# Patient Record
Sex: FEMALE | Race: WHITE | NOT HISPANIC OR LATINO | Employment: OTHER | URBAN - METROPOLITAN AREA
[De-identification: names, ages, dates, MRNs, and addresses within clinical notes are randomized per-mention and may not be internally consistent; named-entity substitution may affect disease eponyms.]

---

## 2021-03-01 ENCOUNTER — AMBULATORY - HEALTHEAST (OUTPATIENT)
Dept: NURSING | Facility: CLINIC | Age: 71
End: 2021-03-01

## 2021-03-22 ENCOUNTER — AMBULATORY - HEALTHEAST (OUTPATIENT)
Dept: NURSING | Facility: CLINIC | Age: 71
End: 2021-03-22

## 2021-06-26 ENCOUNTER — HEALTH MAINTENANCE LETTER (OUTPATIENT)
Age: 71
End: 2021-06-26

## 2021-10-16 ENCOUNTER — HEALTH MAINTENANCE LETTER (OUTPATIENT)
Age: 71
End: 2021-10-16

## 2022-07-23 ENCOUNTER — HEALTH MAINTENANCE LETTER (OUTPATIENT)
Age: 72
End: 2022-07-23

## 2022-09-06 ENCOUNTER — TRANSFERRED RECORDS (OUTPATIENT)
Dept: HEALTH INFORMATION MANAGEMENT | Facility: CLINIC | Age: 72
End: 2022-09-06

## 2022-09-17 ENCOUNTER — TRANSFERRED RECORDS (OUTPATIENT)
Dept: HEALTH INFORMATION MANAGEMENT | Facility: CLINIC | Age: 72
End: 2022-09-17

## 2022-10-01 ENCOUNTER — HEALTH MAINTENANCE LETTER (OUTPATIENT)
Age: 72
End: 2022-10-01

## 2022-11-03 ENCOUNTER — OFFICE VISIT (OUTPATIENT)
Dept: CARDIOLOGY | Facility: CLINIC | Age: 72
End: 2022-11-03
Payer: MEDICARE

## 2022-11-03 VITALS
SYSTOLIC BLOOD PRESSURE: 140 MMHG | RESPIRATION RATE: 14 BRPM | DIASTOLIC BLOOD PRESSURE: 88 MMHG | HEART RATE: 106 BPM | BODY MASS INDEX: 17.81 KG/M2 | OXYGEN SATURATION: 96 % | WEIGHT: 104.3 LBS | HEIGHT: 64 IN

## 2022-11-03 DIAGNOSIS — R93.1 ELEVATED CORONARY ARTERY CALCIUM SCORE: Primary | ICD-10-CM

## 2022-11-03 DIAGNOSIS — R07.2 PRECORDIAL PAIN: ICD-10-CM

## 2022-11-03 DIAGNOSIS — M05.712 RHEUMATOID ARTHRITIS INVOLVING BOTH SHOULDERS WITH POSITIVE RHEUMATOID FACTOR (H): ICD-10-CM

## 2022-11-03 DIAGNOSIS — M05.711 RHEUMATOID ARTHRITIS INVOLVING BOTH SHOULDERS WITH POSITIVE RHEUMATOID FACTOR (H): ICD-10-CM

## 2022-11-03 DIAGNOSIS — R01.1 HEART MURMUR: ICD-10-CM

## 2022-11-03 LAB
ALBUMIN SERPL BCG-MCNC: 4.5 G/DL (ref 3.5–5.2)
ALP SERPL-CCNC: 96 U/L (ref 35–104)
ALT SERPL W P-5'-P-CCNC: 29 U/L (ref 10–35)
ANION GAP SERPL CALCULATED.3IONS-SCNC: 16 MMOL/L (ref 7–15)
AST SERPL W P-5'-P-CCNC: 38 U/L (ref 10–35)
BILIRUB SERPL-MCNC: 0.5 MG/DL
BUN SERPL-MCNC: 20.9 MG/DL (ref 8–23)
CALCIUM SERPL-MCNC: 11.1 MG/DL (ref 8.8–10.2)
CHLORIDE SERPL-SCNC: 98 MMOL/L (ref 98–107)
CREAT SERPL-MCNC: 0.82 MG/DL (ref 0.51–0.95)
DEPRECATED HCO3 PLAS-SCNC: 25 MMOL/L (ref 22–29)
GFR SERPL CREATININE-BSD FRML MDRD: 76 ML/MIN/1.73M2
GLUCOSE SERPL-MCNC: 105 MG/DL (ref 70–99)
POTASSIUM SERPL-SCNC: 3.9 MMOL/L (ref 3.4–5.3)
PROT SERPL-MCNC: 7.3 G/DL (ref 6.4–8.3)
SODIUM SERPL-SCNC: 139 MMOL/L (ref 136–145)

## 2022-11-03 PROCEDURE — 36415 COLL VENOUS BLD VENIPUNCTURE: CPT | Performed by: INTERNAL MEDICINE

## 2022-11-03 PROCEDURE — 80053 COMPREHEN METABOLIC PANEL: CPT | Performed by: INTERNAL MEDICINE

## 2022-11-03 PROCEDURE — 99204 OFFICE O/P NEW MOD 45 MIN: CPT | Performed by: INTERNAL MEDICINE

## 2022-11-03 RX ORDER — INFLIXIMAB 100 MG/10ML
INJECTION, POWDER, LYOPHILIZED, FOR SOLUTION INTRAVENOUS
COMMUNITY
Start: 2021-07-30 | End: 2023-11-02

## 2022-11-03 RX ORDER — BUMETANIDE 0.5 MG/1
1 TABLET ORAL DAILY
COMMUNITY
Start: 2022-10-08

## 2022-11-03 RX ORDER — ROSUVASTATIN CALCIUM 5 MG/1
2.5 TABLET, COATED ORAL DAILY
Qty: 45 TABLET | Refills: 3 | Status: SHIPPED | OUTPATIENT
Start: 2022-11-03 | End: 2023-10-04

## 2022-11-03 RX ORDER — ALENDRONATE SODIUM 70 MG/1
70 TABLET ORAL
COMMUNITY
Start: 2021-11-22 | End: 2023-11-02

## 2022-11-03 RX ORDER — FAMCICLOVIR 125 MG/1
500 TABLET ORAL
COMMUNITY
End: 2023-11-02

## 2022-11-03 RX ORDER — LEVOTHYROXINE SODIUM 25 UG/1
25 TABLET ORAL
COMMUNITY
End: 2022-11-03

## 2022-11-03 RX ORDER — PROGESTERONE 100 MG/1
100 CAPSULE ORAL
COMMUNITY
End: 2022-11-03

## 2022-11-03 RX ORDER — LEVOTHYROXINE SODIUM 175 UG/1
175 TABLET ORAL
COMMUNITY
Start: 2022-07-18

## 2022-11-03 RX ORDER — PREDNISONE 1 MG/1
1 TABLET ORAL
COMMUNITY
Start: 2021-07-30 | End: 2023-11-02

## 2022-11-03 RX ORDER — HYDROXYCHLOROQUINE SULFATE 200 MG/1
200 TABLET, FILM COATED ORAL DAILY
COMMUNITY

## 2022-11-03 RX ORDER — MULTIVITAMIN
1 TABLET ORAL DAILY
COMMUNITY
End: 2023-11-02

## 2022-11-03 RX ORDER — METHOTREXATE SODIUM 2.5 MG/1
TABLET ORAL WEEKLY
COMMUNITY
End: 2023-11-02

## 2022-11-03 NOTE — PROGRESS NOTES
[facility]  [phone]      Assessment/Recommendations   Patient with recent calcium score of 21 which puts her in the 51st percentile for age and sex.  2 years prior calcium score was 10.  She does have risk factors with her father having a cerebrovascular accident and dying at age 40 with a myocardial infarction.  Mother had pacemaker and some other kinds of heart disease exact etiology unknown.  Her calcium was all noted to be in the left main coronary artery and because of this I would take an aggressive approach to her risk factor modification and cardiac evaluation.  I have recommended that we check her complete metabolic panel to ensure that her liver functions are normal as she also takes methotrexate for rheumatoid arthritis.  We will start her on Crestor 2.5 mg a day with an LDL goal of less than 70 which should be achieved even with a small dose of statin statins.  Warned her about potential joint body aching with the staff and she will call if she has any side effects.    Because of the left main location and unknown obstructive component of plaque in the left main, I think a stress echocardiogram would be warranted and we will get that in the near future.    She also has a very soft heart murmur and the echocardiogram will be helpful to evaluate that as well.    We reviewed the pathophysiology of stable plaque versus unstable plaque strong desire to avoid cardiac events going forward.  For this and I also recommended a baby aspirin each day or at least every other day and she will give that some thought and discuss it further with her rheumatologist.    I have enjoyed the opportunity to be involved in her care.       History of Present Illness/Subjective    Ms. Ramonita Potts is a 72 year old female with recent calcium score that was 21 with all the calcium being in the left main coronary artery.  She had a prior calcium score 2 years ago that was 10.  She is  "struggled with rheumatoid arthritis in her upper extremities.  In the past she was and really had no functional limitations joint issues are what limits her now.  She denies any unusual shortness of breath with activity and occasionally gets some chest discomfort in her left lateral chest which is quite noticeable but not severe.  This lasted for 1 to 2 minutes and then spontaneously goes away she feels it is most likely related to his emotional stress.  She does not notice this when she is out walking or being physically active.  She denies orthopnea, paroxysmal nocturnal dyspnea, and has had some fluid retention and takes a small dose of Bumex.  She has done that for years.  She has no history rheumatic fever, heart murmur, cerebrovascular accident or TIA.  She has not been treated for hypertension, recent LDL cholesterol was 88.  She is not diabetic and has never smoked cigarettes.  Her father had a stroke and she reports he  of a myocardial infarction at age 40.  2 brothers have heart problems of a variety maker.    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         Physical Examination Review of Systems   BP (!) 140/88 (BP Location: Left arm, Patient Position: Sitting, Cuff Size: Adult Regular)   Pulse 106   Resp 14   Ht 1.613 m (5' 3.5\")   Wt 47.3 kg (104 lb 4.8 oz)   SpO2 96%   BMI 18.19 kg/m    Body mass index is 18.19 kg/m .  Wt Readings from Last 3 Encounters:   22 47.3 kg (104 lb 4.8 oz)     General Appearance:   Alert, cooperative and in no acute distress.   ENT/Mouth: Patient wearing a mask.      EYES:  no scleral icterus, normal conjunctivae   Neck: JVP normal. No Hepatojugular reflux. Thyroid not visualized.   Chest/Lungs:   Lungs are clear to auscultation, equal chest wall expansion.   Cardiovascular:   S1, S2 with 1/6 systolic murmur heard at the right upper sternal border, no clicks or rubs. Brachial, radial " "and posterior tibial pulses are intact and symetric. No carotid bruits noted   Abdomen:  Nontender. BS+.    Extremities: No cyanosis, clubbing or edema   Skin: no xanthelasma, warm.    Neurologic: normal arm movement bilateral, no tremors     Psychiatric: Appropriate affect.      Enc Vitals  BP: (!) 140/88  Pulse: 106  Resp: 14  SpO2: 96 %  Weight: 47.3 kg (104 lb 4.8 oz)  Height: 161.3 cm (5' 3.5\")                                           Medical History  Surgical History Family History Social History   No past medical history on file. No past surgical history on file. No family history on file. Social History     Socioeconomic History     Marital status:      Spouse name: Not on file     Number of children: Not on file     Years of education: Not on file     Highest education level: Not on file   Occupational History     Not on file   Tobacco Use     Smoking status: Never     Smokeless tobacco: Never   Vaping Use     Vaping Use: Not on file   Substance and Sexual Activity     Alcohol use: Not on file     Drug use: Not on file     Sexual activity: Not on file   Other Topics Concern     Not on file   Social History Narrative     Not on file     Social Determinants of Health     Financial Resource Strain: Not on file   Food Insecurity: Not on file   Transportation Needs: Not on file   Physical Activity: Not on file   Stress: Not on file   Social Connections: Not on file   Intimate Partner Violence: Not on file   Housing Stability: Not on file          Medications  Allergies   Current Outpatient Medications   Medication Sig Dispense Refill     alendronate (FOSAMAX) 70 MG tablet Take 70 mg by mouth       bumetanide (BUMEX) 0.5 MG tablet Take 1 tablet by mouth daily       famciclovir (FAMVIR) 125 MG tablet Take 125 mg by mouth       hydroxychloroquine (PLAQUENIL) 200 MG tablet Take 200 mg by mouth daily       inFLIXimab (REMICADE) 100 MG injection        levothyroxine (SYNTHROID/LEVOTHROID) 175 MCG tablet Take " 175 mcg by mouth       methotrexate 2.5 MG tablet Take by mouth once a week       Multiple Vitamin (ONE-A-DAY ESSENTIAL) TABS Take 1 tablet by mouth daily       predniSONE (DELTASONE) 1 MG tablet Take 1 mg by mouth       rosuvastatin (CRESTOR) 5 MG tablet Take 0.5 tablets (2.5 mg) by mouth daily 45 tablet 3    Allergies   Allergen Reactions     Acetaminophen Headache     Sinus pressure     Other Drug Allergy (See Comments) Unknown     PTU     Penicillins Unknown     Propylthiouracil      Valtrex [Valacyclovir] Unknown         Lab Results    Chemistry/lipid CBC Cardiac Enzymes/BNP/TSH/INR   No results found for: CHOL, HDL, TRIG, CHOLHDL, CREATININE, BUN, NA, CO2 No results found for: WBC, HGB, HCT, MCV, PLT No results found for: CKTOTAL, CKMB, TROPONINI, BNP, TSH, INR

## 2022-11-03 NOTE — LETTER
11/3/2022    Lydia Branch MD  [address]  [place] [address]    RE: Ramonita Potts       Dear Colleague,     I had the pleasure of seeing Ramonita Potts in the [facility].      [facility]  [phone]      Assessment/Recommendations   Patient with recent calcium score of 21 which puts her in the 51st percentile for age and sex.  2 years prior calcium score was 10.  She does have risk factors with her father having a cerebrovascular accident and dying at age 40 with a myocardial infarction.  Mother had pacemaker and some other kinds of heart disease exact etiology unknown.  Her calcium was all noted to be in the left main coronary artery and because of this I would take an aggressive approach to her risk factor modification and cardiac evaluation.  I have recommended that we check her complete metabolic panel to ensure that her liver functions are normal as she also takes methotrexate for rheumatoid arthritis.  We will start her on Crestor 2.5 mg a day with an LDL goal of less than 70 which should be achieved even with a small dose of statin statins.  Warned her about potential joint body aching with the staff and she will call if she has any side effects.    Because of the left main location and unknown obstructive component of plaque in the left main, I think a stress echocardiogram would be warranted and we will get that in the near future.    She also has a very soft heart murmur and the echocardiogram will be helpful to evaluate that as well.    We reviewed the pathophysiology of stable plaque versus unstable plaque strong desire to avoid cardiac events going forward.  For this and I also recommended a baby aspirin each day or at least every other day and she will give that some thought and discuss it further with her rheumatologist.    I have enjoyed the opportunity to be involved in her care.       History of Present  "Illness/Subjective    Ms. Ramonita Potts is a 72 year old female with recent calcium score that was 21 with all the calcium being in the left main coronary artery.  She had a prior calcium score 2 years ago that was 10.  She is struggled with rheumatoid arthritis in her upper extremities.  In the past she was and really had no functional limitations joint issues are what limits her now.  She denies any unusual shortness of breath with activity and occasionally gets some chest discomfort in her left lateral chest which is quite noticeable but not severe.  This lasted for 1 to 2 minutes and then spontaneously goes away she feels it is most likely related to his emotional stress.  She does not notice this when she is out walking or being physically active.  She denies orthopnea, paroxysmal nocturnal dyspnea, and has had some fluid retention and takes a small dose of Bumex.  She has done that for years.  She has no history rheumatic fever, heart murmur, cerebrovascular accident or TIA.  She has not been treated for hypertension, recent LDL cholesterol was 88.  She is not diabetic and has never smoked cigarettes.  Her father had a stroke and she reports he  of a myocardial infarction at age 40.  2 brothers have heart problems of a variety maker.    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         Physical Examination Review of Systems   BP (!) 140/88 (BP Location: Left arm, Patient Position: Sitting, Cuff Size: Adult Regular)   Pulse 106   Resp 14   Ht 1.613 m (5' 3.5\")   Wt 47.3 kg (104 lb 4.8 oz)   SpO2 96%   BMI 18.19 kg/m    Body mass index is 18.19 kg/m .  Wt Readings from Last 3 Encounters:   22 47.3 kg (104 lb 4.8 oz)     General Appearance:   Alert, cooperative and in no acute distress.   ENT/Mouth: Patient wearing a mask.      EYES:  no scleral icterus, normal conjunctivae   Neck: JVP normal. No " "Hepatojugular reflux. Thyroid not visualized.   Chest/Lungs:   Lungs are clear to auscultation, equal chest wall expansion.   Cardiovascular:   S1, S2 with 1/6 systolic murmur heard at the right upper sternal border, no clicks or rubs. Brachial, radial and posterior tibial pulses are intact and symetric. No carotid bruits noted   Abdomen:  Nontender. BS+.    Extremities: No cyanosis, clubbing or edema   Skin: no xanthelasma, warm.    Neurologic: normal arm movement bilateral, no tremors     Psychiatric: Appropriate affect.      Enc Vitals  BP: (!) 140/88  Pulse: 106  Resp: 14  SpO2: 96 %  Weight: 47.3 kg (104 lb 4.8 oz)  Height: 161.3 cm (5' 3.5\")                                           Medical History  Surgical History Family History Social History   No past medical history on file. No past surgical history on file. No family history on file. Social History     Socioeconomic History     Marital status:      Spouse name: Not on file     Number of children: Not on file     Years of education: Not on file     Highest education level: Not on file   Occupational History     Not on file   Tobacco Use     Smoking status: Never     Smokeless tobacco: Never   Vaping Use     Vaping Use: Not on file   Substance and Sexual Activity     Alcohol use: Not on file     Drug use: Not on file     Sexual activity: Not on file   Other Topics Concern     Not on file   Social History Narrative     Not on file     Social Determinants of Health     Financial Resource Strain: Not on file   Food Insecurity: Not on file   Transportation Needs: Not on file   Physical Activity: Not on file   Stress: Not on file   Social Connections: Not on file   Intimate Partner Violence: Not on file   Housing Stability: Not on file          Medications  Allergies   Current Outpatient Medications   Medication Sig Dispense Refill     alendronate (FOSAMAX) 70 MG tablet Take 70 mg by mouth       bumetanide (BUMEX) 0.5 MG tablet Take 1 tablet by mouth " daily       famciclovir (FAMVIR) 125 MG tablet Take 125 mg by mouth       hydroxychloroquine (PLAQUENIL) 200 MG tablet Take 200 mg by mouth daily       inFLIXimab (REMICADE) 100 MG injection        levothyroxine (SYNTHROID/LEVOTHROID) 175 MCG tablet Take 175 mcg by mouth       methotrexate 2.5 MG tablet Take by mouth once a week       Multiple Vitamin (ONE-A-DAY ESSENTIAL) TABS Take 1 tablet by mouth daily       predniSONE (DELTASONE) 1 MG tablet Take 1 mg by mouth       rosuvastatin (CRESTOR) 5 MG tablet Take 0.5 tablets (2.5 mg) by mouth daily 45 tablet 3    Allergies   Allergen Reactions     Acetaminophen Headache     Sinus pressure     Other Drug Allergy (See Comments) Unknown     PTU     Penicillins Unknown     Propylthiouracil      Valtrex [Valacyclovir] Unknown         Lab Results    Chemistry/lipid CBC Cardiac Enzymes/BNP/TSH/INR   No results found for: CHOL, HDL, TRIG, CHOLHDL, CREATININE, BUN, NA, CO2 No results found for: WBC, HGB, HCT, MCV, PLT No results found for: CKTOTAL, CKMB, TROPONINI, BNP, TSH, INR           Thank you for allowing me to participate in the care of your patient.      Sincerely,     Thomas Dos Santos MD     [facility]  cc:   No referring provider defined for this encounter.

## 2022-11-28 ENCOUNTER — HOSPITAL ENCOUNTER (OUTPATIENT)
Dept: CARDIOLOGY | Facility: CLINIC | Age: 72
Discharge: HOME | End: 2022-11-28
Attending: INTERNAL MEDICINE | Admitting: INTERNAL MEDICINE
Payer: MEDICARE

## 2022-11-28 DIAGNOSIS — R01.1 HEART MURMUR: ICD-10-CM

## 2022-11-28 DIAGNOSIS — M05.712 RHEUMATOID ARTHRITIS INVOLVING BOTH SHOULDERS WITH POSITIVE RHEUMATOID FACTOR (H): ICD-10-CM

## 2022-11-28 DIAGNOSIS — M05.711 RHEUMATOID ARTHRITIS INVOLVING BOTH SHOULDERS WITH POSITIVE RHEUMATOID FACTOR (H): ICD-10-CM

## 2022-11-28 DIAGNOSIS — R07.2 PRECORDIAL PAIN: ICD-10-CM

## 2022-11-28 DIAGNOSIS — R93.1 ELEVATED CORONARY ARTERY CALCIUM SCORE: ICD-10-CM

## 2022-11-28 PROCEDURE — 93325 DOPPLER ECHO COLOR FLOW MAPG: CPT | Mod: TC

## 2022-11-28 PROCEDURE — 93017 CV STRESS TEST TRACING ONLY: CPT

## 2022-11-28 PROCEDURE — 93350 STRESS TTE ONLY: CPT | Mod: 26 | Performed by: INTERNAL MEDICINE

## 2022-11-28 PROCEDURE — 93321 DOPPLER ECHO F-UP/LMTD STD: CPT | Mod: 26 | Performed by: INTERNAL MEDICINE

## 2022-11-28 PROCEDURE — 93016 CV STRESS TEST SUPVJ ONLY: CPT | Performed by: INTERNAL MEDICINE

## 2022-11-28 PROCEDURE — 93325 DOPPLER ECHO COLOR FLOW MAPG: CPT | Mod: 26 | Performed by: INTERNAL MEDICINE

## 2022-11-28 PROCEDURE — 93018 CV STRESS TEST I&R ONLY: CPT | Performed by: INTERNAL MEDICINE

## 2022-11-28 PROCEDURE — 93350 STRESS TTE ONLY: CPT | Mod: TC

## 2022-11-28 RX ORDER — LANOLIN ALCOHOL/MO/W.PET/CERES
1000 CREAM (GRAM) TOPICAL DAILY
COMMUNITY

## 2022-11-28 RX ORDER — BIOTIN 1 MG
1000 TABLET ORAL DAILY
COMMUNITY

## 2022-11-28 RX ORDER — IBUPROFEN 200 MG
200 TABLET ORAL
COMMUNITY
End: 2023-11-02

## 2022-11-28 RX ORDER — CALCIUM CARBONATE/VITAMIN D3 600 MG-10
1 TABLET ORAL 2 TIMES DAILY
COMMUNITY

## 2022-11-28 RX ORDER — MULTIVIT WITH MINERALS/LUTEIN
1000 TABLET ORAL DAILY
COMMUNITY

## 2022-11-28 RX ORDER — TACROLIMUS 1 MG/G
OINTMENT TOPICAL
COMMUNITY

## 2022-11-28 RX ORDER — TRIAMCINOLONE ACETONIDE 1 MG/G
CREAM TOPICAL
COMMUNITY

## 2022-11-28 RX ORDER — SULFASALAZINE 500 MG/1
500 TABLET ORAL 4 TIMES DAILY
COMMUNITY

## 2022-11-28 RX ORDER — CALCIUM CARBONATE/VITAMIN D3 500-10/5ML
LIQUID (ML) ORAL DAILY
COMMUNITY

## 2022-11-28 RX ORDER — FOLIC ACID 1 MG/1
1 TABLET ORAL 2 TIMES DAILY
COMMUNITY

## 2022-11-28 RX ORDER — BETAMETHASONE DIPROPIONATE 0.5 MG/G
CREAM TOPICAL DAILY
COMMUNITY

## 2022-11-28 RX ORDER — TRETINOIN 0.25 MG/G
CREAM TOPICAL
COMMUNITY

## 2022-11-28 RX ORDER — ZOLEDRONIC ACID 5 MG/100ML
5 INJECTION, SOLUTION INTRAVENOUS ONCE
COMMUNITY
End: 2023-11-02

## 2022-11-28 RX ORDER — MUPIROCIN 20 MG/G
OINTMENT TOPICAL
COMMUNITY

## 2022-12-02 ENCOUNTER — TRANSFERRED RECORDS (OUTPATIENT)
Dept: HEALTH INFORMATION MANAGEMENT | Facility: CLINIC | Age: 72
End: 2022-12-02

## 2022-12-19 ENCOUNTER — MYC MEDICAL ADVICE (OUTPATIENT)
Dept: CARDIOLOGY | Facility: CLINIC | Age: 72
End: 2022-12-19

## 2023-01-05 NOTE — TELEPHONE ENCOUNTER
----- Message -----  From: Thomas Dos Santos MD  Sent: 1/2/2023   7:36 AM CST  To: Kassie House RN  Subject: FW: Lab Work and Other Tests                     Kassie,    I was unable to update her care everywhere to look at the CT scan but we do have an exact calculation of her coronary score which is more important and then a general comment from the radiologist about coronary calcification.    Not sure about internal medicine docs for she and her ?  Would consider Dr. Edward in [facility] or Dr. Da Birmingham at [facility]?  Rheumatologists are difficult to find.  Dr. Sangita Lynne any diet is very good but she may be nearing detention or even retired?  Dr. Leidy Orosco is also an excellent rheumatologist out of [facility] but is 3 to 4 years younger than me so may not be working for a long time either.         ==  MyChart response to patient. -chaob

## 2023-01-10 NOTE — TELEPHONE ENCOUNTER
----- Message -----  From: Thomas Dos Santos MD  Sent: 1/9/2023   3:46 PM CST  To: Kassie House, RN  Subject: FW: Lab Work and Other Tests                     Given her other therapy, I think once a month for the first 3 months is a good idea.    Things,      ----- Message -----  From: Kassie House RN  Sent: 1/9/2023   2:47 PM CST  To: Thomas Dos Santos MD  Subject: FW: Lab Work and Other Tests                     Dr. Dos Santos, you previously recommended LFTs once a month for the first 3 months while on statin. Do you think that needs to be completed or cancel since LFT last month was normal? -ejb    ----- Message -----  From: Thomas Dos Santos MD  Sent: 1/6/2023   9:47 AM CST  To: Kassie House RN  Subject: FW: Lab Work and Other Tests                     Lipid panel and liver function test would be good.  She may have had liver function test with her rheumatologist lately in which case we do not need them if they were done in the last 3 months.    Thanks,

## 2023-01-12 ENCOUNTER — LAB (OUTPATIENT)
Dept: LAB | Facility: CLINIC | Age: 73
End: 2023-01-12
Payer: MEDICARE

## 2023-01-12 DIAGNOSIS — R07.2 PRECORDIAL PAIN: ICD-10-CM

## 2023-01-12 DIAGNOSIS — M05.712 RHEUMATOID ARTHRITIS INVOLVING BOTH SHOULDERS WITH POSITIVE RHEUMATOID FACTOR (H): ICD-10-CM

## 2023-01-12 DIAGNOSIS — M05.711 RHEUMATOID ARTHRITIS INVOLVING BOTH SHOULDERS WITH POSITIVE RHEUMATOID FACTOR (H): ICD-10-CM

## 2023-01-12 DIAGNOSIS — R93.1 ELEVATED CORONARY ARTERY CALCIUM SCORE: ICD-10-CM

## 2023-01-12 DIAGNOSIS — R01.1 HEART MURMUR: ICD-10-CM

## 2023-01-12 LAB
ALBUMIN SERPL BCG-MCNC: 4.3 G/DL (ref 3.5–5.2)
ALP SERPL-CCNC: 74 U/L (ref 35–104)
ALT SERPL W P-5'-P-CCNC: 34 U/L (ref 10–35)
AST SERPL W P-5'-P-CCNC: 49 U/L (ref 10–35)
BILIRUB DIRECT SERPL-MCNC: <0.2 MG/DL (ref 0–0.3)
BILIRUB SERPL-MCNC: 0.6 MG/DL
CHOLEST SERPL-MCNC: 237 MG/DL
HDLC SERPL-MCNC: 137 MG/DL
LDLC SERPL CALC-MCNC: 90 MG/DL
NONHDLC SERPL-MCNC: 100 MG/DL
PROT SERPL-MCNC: 6.9 G/DL (ref 6.4–8.3)
TRIGL SERPL-MCNC: 51 MG/DL

## 2023-01-12 PROCEDURE — 36415 COLL VENOUS BLD VENIPUNCTURE: CPT

## 2023-01-12 PROCEDURE — 80076 HEPATIC FUNCTION PANEL: CPT

## 2023-01-12 PROCEDURE — 80061 LIPID PANEL: CPT

## 2023-02-06 ENCOUNTER — LAB (OUTPATIENT)
Dept: LAB | Facility: CLINIC | Age: 73
End: 2023-02-06
Payer: MEDICARE

## 2023-02-06 DIAGNOSIS — R93.1 ELEVATED CORONARY ARTERY CALCIUM SCORE: ICD-10-CM

## 2023-02-06 LAB
ALBUMIN SERPL BCG-MCNC: 4.5 G/DL (ref 3.5–5.2)
ALP SERPL-CCNC: 76 U/L (ref 35–104)
ALT SERPL W P-5'-P-CCNC: 31 U/L (ref 10–35)
AST SERPL W P-5'-P-CCNC: 47 U/L (ref 10–35)
BILIRUB DIRECT SERPL-MCNC: <0.2 MG/DL (ref 0–0.3)
BILIRUB SERPL-MCNC: 0.5 MG/DL
PROT SERPL-MCNC: 7.2 G/DL (ref 6.4–8.3)

## 2023-02-06 PROCEDURE — 80076 HEPATIC FUNCTION PANEL: CPT

## 2023-02-06 PROCEDURE — 36415 COLL VENOUS BLD VENIPUNCTURE: CPT

## 2023-02-09 DIAGNOSIS — R93.1 ELEVATED CORONARY ARTERY CALCIUM SCORE: Primary | ICD-10-CM

## 2023-03-28 ENCOUNTER — TELEPHONE (OUTPATIENT)
Dept: CARDIOLOGY | Facility: CLINIC | Age: 73
End: 2023-03-28
Payer: MEDICARE

## 2023-03-28 NOTE — TELEPHONE ENCOUNTER
Left detailed message for pt, per hepatic panel from 02/23 Dr. Dos Santos recommended a repeat in 3 months time. Orders are in place. Provided scheduling contact info -LEXII

## 2023-03-28 NOTE — TELEPHONE ENCOUNTER
[facility] Call Center    Phone Message    May a detailed message be left on voicemail: yes     Reason for Call: Other: Ramonita called in wondering when she should have her Hepatic Labs done. I informed Ramonita that Dr. Dos Santos wants to see her around November but Ramonita would like clarification on when she should have her labs done. Please reach out to Ramonita or send her a [employer] message with information. Thank you!     Action Taken: Other: Cardiology    Travel Screening: Not Applicable     Thank you!  [facility]

## 2023-04-24 ENCOUNTER — DOCUMENTATION ONLY (OUTPATIENT)
Dept: LAB | Facility: CLINIC | Age: 73
End: 2023-04-24
Payer: MEDICARE

## 2023-04-24 DIAGNOSIS — R93.1 ELEVATED CORONARY ARTERY CALCIUM SCORE: Primary | ICD-10-CM

## 2023-04-24 NOTE — PROGRESS NOTES
Ramonita Potts has an upcoming lab appointment:    Future Appointments   Date Time Provider Department Center   5/3/2023  3:15 PM [facility] LAB [facility]       There is no order available. Please review and place either future orders or HMPO (Review of Health Maintenance Protocol Orders), as appropriate.    Health Maintenance Due   Topic     TSH W/FREE T4 REFLEX      ANNUAL REVIEW OF HM ORDERS      HEPATITIS C SCREENING      Debra Santiago

## 2023-04-24 NOTE — PROGRESS NOTES
Order for Hepatic Panel placed for 3 month check per [facility]. -ejb

## 2023-05-03 ENCOUNTER — LAB (OUTPATIENT)
Dept: LAB | Facility: CLINIC | Age: 73
End: 2023-05-03
Payer: MEDICARE

## 2023-05-03 DIAGNOSIS — R93.1 ELEVATED CORONARY ARTERY CALCIUM SCORE: ICD-10-CM

## 2023-05-03 LAB
ALBUMIN SERPL BCG-MCNC: 4.5 G/DL (ref 3.5–5.2)
ALP SERPL-CCNC: 58 U/L (ref 35–104)
ALT SERPL W P-5'-P-CCNC: 42 U/L (ref 10–35)
AST SERPL W P-5'-P-CCNC: 46 U/L (ref 10–35)
BILIRUB DIRECT SERPL-MCNC: <0.2 MG/DL (ref 0–0.3)
BILIRUB SERPL-MCNC: 0.6 MG/DL
PROT SERPL-MCNC: 6.7 G/DL (ref 6.4–8.3)

## 2023-05-03 PROCEDURE — 36415 COLL VENOUS BLD VENIPUNCTURE: CPT

## 2023-05-03 PROCEDURE — 80076 HEPATIC FUNCTION PANEL: CPT

## 2023-05-05 DIAGNOSIS — R93.1 ELEVATED CORONARY ARTERY CALCIUM SCORE: Primary | ICD-10-CM

## 2023-06-01 ENCOUNTER — TRANSFERRED RECORDS (OUTPATIENT)
Dept: HEALTH INFORMATION MANAGEMENT | Facility: CLINIC | Age: 73
End: 2023-06-01
Payer: MEDICARE

## 2023-07-25 ENCOUNTER — TRANSFERRED RECORDS (OUTPATIENT)
Dept: HEALTH INFORMATION MANAGEMENT | Facility: CLINIC | Age: 73
End: 2023-07-25
Payer: MEDICARE

## 2023-08-06 ENCOUNTER — HEALTH MAINTENANCE LETTER (OUTPATIENT)
Age: 73
End: 2023-08-06

## 2023-08-30 ENCOUNTER — LAB REQUISITION (OUTPATIENT)
Dept: LAB | Facility: CLINIC | Age: 73
End: 2023-08-30
Payer: MEDICARE

## 2023-08-30 DIAGNOSIS — D48.5 NEOPLASM OF UNCERTAIN BEHAVIOR OF SKIN: ICD-10-CM

## 2023-08-30 PROCEDURE — 88305 TISSUE EXAM BY PATHOLOGIST: CPT | Mod: TC,ORL | Performed by: DERMATOLOGY

## 2023-08-30 PROCEDURE — 88305 TISSUE EXAM BY PATHOLOGIST: CPT | Mod: 26 | Performed by: DERMATOLOGY

## 2023-09-01 LAB
PATH REPORT.COMMENTS IMP SPEC: ABNORMAL
PATH REPORT.COMMENTS IMP SPEC: ABNORMAL
PATH REPORT.COMMENTS IMP SPEC: YES
PATH REPORT.FINAL DX SPEC: ABNORMAL
PATH REPORT.GROSS SPEC: ABNORMAL
PATH REPORT.MICROSCOPIC SPEC OTHER STN: ABNORMAL
PATH REPORT.RELEVANT HX SPEC: ABNORMAL

## 2023-09-26 ENCOUNTER — TRANSFERRED RECORDS (OUTPATIENT)
Dept: HEALTH INFORMATION MANAGEMENT | Facility: CLINIC | Age: 73
End: 2023-09-26
Payer: MEDICARE

## 2023-09-26 LAB
ALT SERPL-CCNC: 38 IU/L (ref 5–35)
AST SERPL-CCNC: 47 U/L (ref 5–34)
CREATININE (EXTERNAL): 0.68 MG/DL (ref 0.5–1.3)

## 2023-10-04 DIAGNOSIS — M05.712 RHEUMATOID ARTHRITIS INVOLVING BOTH SHOULDERS WITH POSITIVE RHEUMATOID FACTOR (H): ICD-10-CM

## 2023-10-04 DIAGNOSIS — R07.2 PRECORDIAL PAIN: ICD-10-CM

## 2023-10-04 DIAGNOSIS — R93.1 ELEVATED CORONARY ARTERY CALCIUM SCORE: ICD-10-CM

## 2023-10-04 DIAGNOSIS — R01.1 HEART MURMUR: ICD-10-CM

## 2023-10-04 DIAGNOSIS — M05.711 RHEUMATOID ARTHRITIS INVOLVING BOTH SHOULDERS WITH POSITIVE RHEUMATOID FACTOR (H): ICD-10-CM

## 2023-10-04 RX ORDER — ROSUVASTATIN CALCIUM 5 MG/1
2.5 TABLET, COATED ORAL DAILY
Qty: 45 TABLET | Refills: 3 | Status: SHIPPED | OUTPATIENT
Start: 2023-10-04 | End: 2024-06-07

## 2023-11-02 ENCOUNTER — OFFICE VISIT (OUTPATIENT)
Dept: CARDIOLOGY | Facility: CLINIC | Age: 73
End: 2023-11-02
Payer: MEDICARE

## 2023-11-02 VITALS
BODY MASS INDEX: 18.52 KG/M2 | DIASTOLIC BLOOD PRESSURE: 95 MMHG | WEIGHT: 108.5 LBS | HEART RATE: 75 BPM | HEIGHT: 64 IN | OXYGEN SATURATION: 98 % | RESPIRATION RATE: 14 BRPM | TEMPERATURE: 98.1 F | SYSTOLIC BLOOD PRESSURE: 155 MMHG

## 2023-11-02 DIAGNOSIS — R93.1 ELEVATED CORONARY ARTERY CALCIUM SCORE: ICD-10-CM

## 2023-11-02 DIAGNOSIS — R01.1 HEART MURMUR: ICD-10-CM

## 2023-11-02 DIAGNOSIS — R07.2 PRECORDIAL PAIN: ICD-10-CM

## 2023-11-02 DIAGNOSIS — M05.711 RHEUMATOID ARTHRITIS INVOLVING BOTH SHOULDERS WITH POSITIVE RHEUMATOID FACTOR (H): ICD-10-CM

## 2023-11-02 DIAGNOSIS — M05.712 RHEUMATOID ARTHRITIS INVOLVING BOTH SHOULDERS WITH POSITIVE RHEUMATOID FACTOR (H): ICD-10-CM

## 2023-11-02 PROCEDURE — 99214 OFFICE O/P EST MOD 30 MIN: CPT | Performed by: INTERNAL MEDICINE

## 2023-11-02 NOTE — PROGRESS NOTES
[facility]  [phone]          Assessment/Recommendations   Patient with known calcium score of 21, now on low-dose statin with an LDL cholesterol below 100 at 90.  She is tolerating the statin with very minimal elevation in her liver function tests and she will send me the last readings from her rheumatologist.  She has not been exercising regularly in part because she has arthritis all of this is stabilized significantly and she feels she can get back to an exercise routine.  Historically she was very athletic and a big exercise person and I have strongly encouraged her to get back to her regular routine of physical activity.  If her arthritis is problematic, she could consider warm water exercise at the [address] right here in [place].    Blood pressure elevated today but she has had a hectic morning, going to the [facility] clinic and received back up to the [place] clinic.  She feels like her blood pressure is better at home but she actually does not take the blood pressure at home.  She does have a blood pressure cuff so I have asked her to check it 3 times a week, and send us 10 blood pressure readings.  She will also send us the liver function tests from the rheumatologist and will work on starting a exercise routine.    No changes in her current medications but may need to be a candidate for antihypertensive medications if her home blood pressures are in the range of today's reading.    Thank you for allowing us to participate in her care.       History of Present Illness/Subjective    Ms. Ramonita Potts is a 73 year old female with known elevated calcium score, taking low-dose rosuvastatin and known elevation in liver functions which have been only mild.  She has been feeling well from a cardiac standpoint.  She denies any chest discomfort or unusual shortness of breath with activity.  In the past she was an athlete and avid exerciser but she has not been doing  "that over the last 3 years since her arthritis became problematic.  Her arthritis is under better control now.  She denies orthopnea, paroxysmal nocturnal dyspnea, peripheral edema, syncope or palpitations.  No chest discomforts.  She has a blood pressure cuff but does not take her blood pressure at home.           Physical Examination Review of Systems   BP (!) 155/95 (BP Location: Left arm, Patient Position: Sitting, Cuff Size: Adult Regular)   Pulse 75   Temp 98.1  F (36.7  C) (Oral)   Resp 14   Ht 1.613 m (5' 3.5\")   Wt 49.2 kg (108 lb 8 oz)   SpO2 98%   BMI 18.92 kg/m    Body mass index is 18.92 kg/m .  Wt Readings from Last 3 Encounters:   11/02/23 49.2 kg (108 lb 8 oz)   11/03/22 47.3 kg (104 lb 4.8 oz)     General Appearance:   Alert, cooperative and in no acute distress.   ENT/Mouth: Pink/moist oral mucosa   EYES:  no scleral icterus, normal conjunctivae   Neck: JVP normal. No Hepatojugular reflux. Thyroid not visualized.   Chest/Lungs:   Lungs are clear to auscultation, equal chest wall expansion.   Cardiovascular:   S1, S2 without murmur ,clicks or rubs. Brachial, radial  pulses are intact and symetric. No carotid bruits noted   Abdomen:  Nontender.    Extremities: No cyanosis, clubbing or edema   Skin: no xanthelasma, warm.    Neurologic: normal arm movement bilateral, no tremors     Psychiatric: Appropriate affect.      Enc Vitals  BP: (!) 155/95  Pulse: 75  Resp: 14  Temp: 98.1  F (36.7  C)  Temp src: Oral  SpO2: 98 %  Weight: 49.2 kg (108 lb 8 oz)  Height: 161.3 cm (5' 3.5\")                                           Medical History  Surgical History Family History Social History   No past medical history on file. No past surgical history on file. No family history on file. Social History     Socioeconomic History    Marital status:      Spouse name: Not on file    Number of children: Not on file    Years of education: Not on file    Highest education level: Not on file   Occupational " History    Not on file   Tobacco Use    Smoking status: Never    Smokeless tobacco: Never   Vaping Use    Vaping Use: Not on file   Substance and Sexual Activity    Alcohol use: Not on file    Drug use: Not on file    Sexual activity: Not Currently     Partners: Male   Other Topics Concern    Not on file   Social History Narrative    Not on file     Social Determinants of Health     Financial Resource Strain: Not on file   Food Insecurity: Not on file   Transportation Needs: Not on file   Physical Activity: Not on file   Stress: Not on file   Social Connections: Not on file   Interpersonal Safety: Not on file   Housing Stability: Not on file          Medications  Allergies   Current Outpatient Medications   Medication Sig Dispense Refill    aluminum chloride (DRYSOL) 20 % external solution Apply topically At Bedtime      betamethasone dipropionate (DIPROSONE) 0.05 % external cream Apply topically daily      biotin 1000 MCG TABS tablet Take 1,000 mcg by mouth daily      bumetanide (BUMEX) 0.5 MG tablet Take 1 tablet by mouth daily      calcium carbonate-vitamin D (CALTRATE) 600-10 MG-MCG per tablet Take 1 tablet by mouth 2 times daily      cyanocobalamin (VITAMIN B-12) 1000 MCG tablet Take 1,000 mcg by mouth daily      folic acid (FOLVITE) 1 MG tablet Take 1 mg by mouth 2 times daily      hydroxychloroquine (PLAQUENIL) 200 MG tablet Take 200 mg by mouth daily      levothyroxine (SYNTHROID/LEVOTHROID) 175 MCG tablet Take 175 mcg by mouth      magnesium oxide 400 MG CAPS Take by mouth daily      Multiple Vitamins-Minerals (CENTRUM SILVER 50+WOMEN) TABS Take by mouth daily      mupirocin (BACTROBAN) 2 % external ointment       Potassium 95 MG TABS Take 90 mg by mouth three times a week      rosuvastatin (CRESTOR) 5 MG tablet TAKE 1/2 TABLET(2.5 MG) BY MOUTH DAILY 45 tablet 3    sulfaSALAzine (AZULFIDINE) 500 MG tablet Take 500 mg by mouth 4 times daily      tacrolimus (PROTOPIC) 0.1 % external ointment       tretinoin  "(RETIN-A) 0.025 % external cream       triamcinolone (KENALOG) 0.1 % external cream       vitamin C (ASCORBIC ACID) 1000 MG TABS Take 1,000 mg by mouth daily      Allergies   Allergen Reactions    Acetaminophen Headache     Sinus pressure    Other Drug Allergy (See Comments) Unknown     PTU    Penicillins Unknown    Propylthiouracil     Valtrex [Valacyclovir] Unknown    Sympathomimetics Rash         Lab Results    Chemistry/lipid CBC Cardiac Enzymes/BNP/TSH/INR   Lab Results   Component Value Date    CHOL 237 (H) 01/12/2023     01/12/2023    TRIG 51 01/12/2023    BUN 20.9 11/03/2022     11/03/2022    CO2 25 11/03/2022    No results found for: \"WBC\", \"HGB\", \"HCT\", \"MCV\", \"PLT\" No results found for: \"CKTOTAL\", \"CKMB\", \"TROPONINI\", \"BNP\", \"TSH\", \"INR\"                                         "

## 2023-11-02 NOTE — LETTER
11/2/2023    Lydia Branch MD  [address]  [facility] [address]    RE: Ramonita Potts       Dear Colleague,     I had the pleasure of seeing Ramonita Potts in the [facility].      [facility]  [phone]          Assessment/Recommendations   Patient with known calcium score of 21, now on low-dose statin with an LDL cholesterol below 100 at 90.  She is tolerating the statin with very minimal elevation in her liver function tests and she will send me the last readings from her rheumatologist.  She has not been exercising regularly in part because she has arthritis all of this is stabilized significantly and she feels she can get back to an exercise routine.  Historically she was very athletic and a big exercise person and I have strongly encouraged her to get back to her regular routine of physical activity.  If her arthritis is problematic, she could consider warm water exercise at the [facility] right here in [place].    Blood pressure elevated today but she has had a hectic morning, going to the [facility] clinic and received back up to the [place] clinic.  She feels like her blood pressure is better at home but she actually does not take the blood pressure at home.  She does have a blood pressure cuff so I have asked her to check it 3 times a week, and send us 10 blood pressure readings.  She will also send us the liver function tests from the rheumatologist and will work on starting a exercise routine.    No changes in her current medications but may need to be a candidate for antihypertensive medications if her home blood pressures are in the range of today's reading.    Thank you for allowing us to participate in her care.       History of Present Illness/Subjective    Ms. Ramonita Potts is a 73 year old female with known elevated calcium score, taking low-dose rosuvastatin and known elevation in liver functions which  "have been only mild.  She has been feeling well from a cardiac standpoint.  She denies any chest discomfort or unusual shortness of breath with activity.  In the past she was an athlete and avid exerciser but she has not been doing that over the last 3 years since her arthritis became problematic.  Her arthritis is under better control now.  She denies orthopnea, paroxysmal nocturnal dyspnea, peripheral edema, syncope or palpitations.  No chest discomforts.  She has a blood pressure cuff but does not take her blood pressure at home.           Physical Examination Review of Systems   BP (!) 155/95 (BP Location: Left arm, Patient Position: Sitting, Cuff Size: Adult Regular)   Pulse 75   Temp 98.1  F (36.7  C) (Oral)   Resp 14   Ht 1.613 m (5' 3.5\")   Wt 49.2 kg (108 lb 8 oz)   SpO2 98%   BMI 18.92 kg/m    Body mass index is 18.92 kg/m .  Wt Readings from Last 3 Encounters:   11/02/23 49.2 kg (108 lb 8 oz)   11/03/22 47.3 kg (104 lb 4.8 oz)     General Appearance:   Alert, cooperative and in no acute distress.   ENT/Mouth: Pink/moist oral mucosa   EYES:  no scleral icterus, normal conjunctivae   Neck: JVP normal. No Hepatojugular reflux. Thyroid not visualized.   Chest/Lungs:   Lungs are clear to auscultation, equal chest wall expansion.   Cardiovascular:   S1, S2 without murmur ,clicks or rubs. Brachial, radial  pulses are intact and symetric. No carotid bruits noted   Abdomen:  Nontender.    Extremities: No cyanosis, clubbing or edema   Skin: no xanthelasma, warm.    Neurologic: normal arm movement bilateral, no tremors     Psychiatric: Appropriate affect.      Enc Vitals  BP: (!) 155/95  Pulse: 75  Resp: 14  Temp: 98.1  F (36.7  C)  Temp src: Oral  SpO2: 98 %  Weight: 49.2 kg (108 lb 8 oz)  Height: 161.3 cm (5' 3.5\")                                           Medical History  Surgical History Family History Social History   No past medical history on file. No past surgical history on file. No family history on " file. Social History     Socioeconomic History    Marital status:      Spouse name: Not on file    Number of children: Not on file    Years of education: Not on file    Highest education level: Not on file   Occupational History    Not on file   Tobacco Use    Smoking status: Never    Smokeless tobacco: Never   Vaping Use    Vaping Use: Not on file   Substance and Sexual Activity    Alcohol use: Not on file    Drug use: Not on file    Sexual activity: Not Currently     Partners: Male   Other Topics Concern    Not on file   Social History Narrative    Not on file     Social Determinants of Health     Financial Resource Strain: Not on file   Food Insecurity: Not on file   Transportation Needs: Not on file   Physical Activity: Not on file   Stress: Not on file   Social Connections: Not on file   Interpersonal Safety: Not on file   Housing Stability: Not on file          Medications  Allergies   Current Outpatient Medications   Medication Sig Dispense Refill    aluminum chloride (DRYSOL) 20 % external solution Apply topically At Bedtime      betamethasone dipropionate (DIPROSONE) 0.05 % external cream Apply topically daily      biotin 1000 MCG TABS tablet Take 1,000 mcg by mouth daily      bumetanide (BUMEX) 0.5 MG tablet Take 1 tablet by mouth daily      calcium carbonate-vitamin D (CALTRATE) 600-10 MG-MCG per tablet Take 1 tablet by mouth 2 times daily      cyanocobalamin (VITAMIN B-12) 1000 MCG tablet Take 1,000 mcg by mouth daily      folic acid (FOLVITE) 1 MG tablet Take 1 mg by mouth 2 times daily      hydroxychloroquine (PLAQUENIL) 200 MG tablet Take 200 mg by mouth daily      levothyroxine (SYNTHROID/LEVOTHROID) 175 MCG tablet Take 175 mcg by mouth      magnesium oxide 400 MG CAPS Take by mouth daily      Multiple Vitamins-Minerals (CENTRUM SILVER 50+WOMEN) TABS Take by mouth daily      mupirocin (BACTROBAN) 2 % external ointment       Potassium 95 MG TABS Take 90 mg by mouth three times a week       "rosuvastatin (CRESTOR) 5 MG tablet TAKE 1/2 TABLET(2.5 MG) BY MOUTH DAILY 45 tablet 3    sulfaSALAzine (AZULFIDINE) 500 MG tablet Take 500 mg by mouth 4 times daily      tacrolimus (PROTOPIC) 0.1 % external ointment       tretinoin (RETIN-A) 0.025 % external cream       triamcinolone (KENALOG) 0.1 % external cream       vitamin C (ASCORBIC ACID) 1000 MG TABS Take 1,000 mg by mouth daily      Allergies   Allergen Reactions    Acetaminophen Headache     Sinus pressure    Other Drug Allergy (See Comments) Unknown     PTU    Penicillins Unknown    Propylthiouracil     Valtrex [Valacyclovir] Unknown    Sympathomimetics Rash         Lab Results    Chemistry/lipid CBC Cardiac Enzymes/BNP/TSH/INR   Lab Results   Component Value Date    CHOL 237 (H) 01/12/2023     01/12/2023    TRIG 51 01/12/2023    BUN 20.9 11/03/2022     11/03/2022    CO2 25 11/03/2022    No results found for: \"WBC\", \"HGB\", \"HCT\", \"MCV\", \"PLT\" No results found for: \"CKTOTAL\", \"CKMB\", \"TROPONINI\", \"BNP\", \"TSH\", \"INR\"             Thank you for allowing me to participate in the care of your patient.      Sincerely,     Thomas Dos Santos MD     [facility]  cc:   Thomas Dos Santos MD  [address] [facility] [address]  [place] [phone]      "

## 2023-11-06 ENCOUNTER — MYC MEDICAL ADVICE (OUTPATIENT)
Dept: CARDIOLOGY | Facility: CLINIC | Age: 73
End: 2023-11-06
Payer: MEDICARE

## 2023-11-06 DIAGNOSIS — R93.1 ELEVATED CORONARY ARTERY CALCIUM SCORE: Primary | ICD-10-CM

## 2023-11-07 NOTE — TELEPHONE ENCOUNTER
From: Thomas Dos Santos MD  Sent: 11/7/2023   9:06 AM CST  To: May Patel  Subject: FW: Tests                                        May,    Could get a complete metabolic panel on her which will include liver function tests.    Thanks,

## 2023-11-14 ENCOUNTER — LAB (OUTPATIENT)
Dept: CARDIOLOGY | Facility: CLINIC | Age: 73
End: 2023-11-14
Payer: MEDICARE

## 2023-11-14 DIAGNOSIS — R93.1 ELEVATED CORONARY ARTERY CALCIUM SCORE: ICD-10-CM

## 2023-11-14 LAB
ALBUMIN SERPL BCG-MCNC: 4.6 G/DL (ref 3.5–5.2)
ALP SERPL-CCNC: 74 U/L (ref 40–150)
ALT SERPL W P-5'-P-CCNC: 44 U/L (ref 0–50)
ANION GAP SERPL CALCULATED.3IONS-SCNC: 10 MMOL/L (ref 7–15)
AST SERPL W P-5'-P-CCNC: 40 U/L (ref 0–45)
BILIRUB SERPL-MCNC: 0.6 MG/DL
BUN SERPL-MCNC: 11.2 MG/DL (ref 8–23)
CALCIUM SERPL-MCNC: 9.3 MG/DL (ref 8.8–10.2)
CHLORIDE SERPL-SCNC: 104 MMOL/L (ref 98–107)
CREAT SERPL-MCNC: 0.69 MG/DL (ref 0.51–0.95)
DEPRECATED HCO3 PLAS-SCNC: 29 MMOL/L (ref 22–29)
EGFRCR SERPLBLD CKD-EPI 2021: >90 ML/MIN/1.73M2
GLUCOSE SERPL-MCNC: 58 MG/DL (ref 70–99)
POTASSIUM SERPL-SCNC: 3.7 MMOL/L (ref 3.4–5.3)
PROT SERPL-MCNC: 6.5 G/DL (ref 6.4–8.3)
SODIUM SERPL-SCNC: 143 MMOL/L (ref 135–145)

## 2023-11-14 PROCEDURE — 36415 COLL VENOUS BLD VENIPUNCTURE: CPT

## 2023-11-14 PROCEDURE — 80053 COMPREHEN METABOLIC PANEL: CPT

## 2023-11-20 ENCOUNTER — TRANSFERRED RECORDS (OUTPATIENT)
Dept: HEALTH INFORMATION MANAGEMENT | Facility: CLINIC | Age: 73
End: 2023-11-20
Payer: MEDICARE

## 2023-11-21 ENCOUNTER — MYC MEDICAL ADVICE (OUTPATIENT)
Dept: CARDIOLOGY | Facility: CLINIC | Age: 73
End: 2023-11-21
Payer: MEDICARE

## 2023-11-24 NOTE — TELEPHONE ENCOUNTER
From: Thomas Dos Santos MD  Sent: 11/22/2023   7:26 AM CST  To: May Patel  Subject: FW: Parathyroid Question                         May,    I told her that Dr. Yusuf King took out my mother's parathyroid gland.  He is working with only 1 partner now.  I think he would be a good choice but I am sure there are others as well that I am unfamiliar with.    The book I was talking about is more regarding expectations and spirituality of aging.  If he is still interested, I can find the tile at home as it is sitting in a pile of partially read books.    Thanks,

## 2023-11-30 ENCOUNTER — TELEPHONE (OUTPATIENT)
Dept: CARDIOLOGY | Facility: CLINIC | Age: 73
End: 2023-11-30
Payer: MEDICARE

## 2023-11-30 DIAGNOSIS — I35.0 AORTIC STENOSIS: Primary | ICD-10-CM

## 2023-11-30 NOTE — TELEPHONE ENCOUNTER
[facility] Call Center    Phone Message    May a detailed message be left on voicemail: yes     Reason for Call: Other: Pt is calling to request lab orders for lipids and alt as discussed.  Appt is 12/5/23     Action Taken: Other: cardio    Travel Screening: Not Applicable    Thank you!  [facility]

## 2023-12-04 NOTE — TELEPHONE ENCOUNTER
Left detailed message for Pt regarding call back request-LEXII      from: Thomas Dos Santos MD  Sent: 12/2/2023   3:09 PM CST  To: May Patel    Sure  ----- Message -----  From: May Patel  Sent: 11/30/2023   4:30 PM CST  To: Thomas Dos Santos MD    ----- Message from May Patel sent at 11/30/2023  4:30 PM CST -----  Hi Dr. Dos Santos,    I have an order for repeat hepatic panel for this Pt but no mention of needing repeat lipids. Would you like a repeat fasting lipid? Last checked 01/2023. Thank you-LEXII

## 2023-12-05 ENCOUNTER — LAB (OUTPATIENT)
Dept: CARDIOLOGY | Facility: CLINIC | Age: 73
End: 2023-12-05
Payer: MEDICARE

## 2023-12-05 DIAGNOSIS — I35.0 AORTIC STENOSIS: ICD-10-CM

## 2023-12-05 DIAGNOSIS — R93.1 ELEVATED CORONARY ARTERY CALCIUM SCORE: Primary | ICD-10-CM

## 2023-12-05 DIAGNOSIS — R93.1 ELEVATED CORONARY ARTERY CALCIUM SCORE: ICD-10-CM

## 2023-12-05 LAB
ALBUMIN SERPL BCG-MCNC: 4.6 G/DL (ref 3.5–5.2)
ALP SERPL-CCNC: 70 U/L (ref 40–150)
ALT SERPL W P-5'-P-CCNC: 31 U/L (ref 0–50)
AST SERPL W P-5'-P-CCNC: 35 U/L (ref 0–45)
BILIRUB DIRECT SERPL-MCNC: 0.21 MG/DL (ref 0–0.3)
BILIRUB SERPL-MCNC: 0.7 MG/DL
CHOLEST SERPL-MCNC: 207 MG/DL
HDLC SERPL-MCNC: 120 MG/DL
LDLC SERPL CALC-MCNC: 73 MG/DL
NONHDLC SERPL-MCNC: 87 MG/DL
PROT SERPL-MCNC: 6.5 G/DL (ref 6.4–8.3)
TRIGL SERPL-MCNC: 70 MG/DL

## 2023-12-05 PROCEDURE — 36415 COLL VENOUS BLD VENIPUNCTURE: CPT

## 2023-12-05 PROCEDURE — 80061 LIPID PANEL: CPT

## 2023-12-05 PROCEDURE — 80076 HEPATIC FUNCTION PANEL: CPT

## 2023-12-06 DIAGNOSIS — R93.1 ELEVATED CORONARY ARTERY CALCIUM SCORE: Primary | ICD-10-CM

## 2024-01-15 ENCOUNTER — TRANSFERRED RECORDS (OUTPATIENT)
Dept: HEALTH INFORMATION MANAGEMENT | Facility: CLINIC | Age: 74
End: 2024-01-15
Payer: MEDICARE

## 2024-02-26 ENCOUNTER — TRANSFERRED RECORDS (OUTPATIENT)
Dept: HEALTH INFORMATION MANAGEMENT | Facility: CLINIC | Age: 74
End: 2024-02-26
Payer: MEDICARE

## 2024-02-26 LAB
ALT SERPL-CCNC: 30 IU/L (ref 5–35)
AST SERPL-CCNC: 42 U/L (ref 5–34)
CREATININE (EXTERNAL): 0.75 MG/DL (ref 0.5–1.3)

## 2024-06-07 DIAGNOSIS — R93.1 ELEVATED CORONARY ARTERY CALCIUM SCORE: ICD-10-CM

## 2024-06-07 DIAGNOSIS — M05.711 RHEUMATOID ARTHRITIS INVOLVING BOTH SHOULDERS WITH POSITIVE RHEUMATOID FACTOR (H): ICD-10-CM

## 2024-06-07 DIAGNOSIS — R01.1 HEART MURMUR: ICD-10-CM

## 2024-06-07 DIAGNOSIS — M05.712 RHEUMATOID ARTHRITIS INVOLVING BOTH SHOULDERS WITH POSITIVE RHEUMATOID FACTOR (H): ICD-10-CM

## 2024-06-07 DIAGNOSIS — R07.2 PRECORDIAL PAIN: ICD-10-CM

## 2024-06-07 RX ORDER — ROSUVASTATIN CALCIUM 5 MG/1
2.5 TABLET, COATED ORAL DAILY
Qty: 45 TABLET | Refills: 1 | Status: SHIPPED | OUTPATIENT
Start: 2024-06-07 | End: 2024-08-31

## 2024-08-31 ENCOUNTER — MYC REFILL (OUTPATIENT)
Dept: CARDIOLOGY | Facility: CLINIC | Age: 74
End: 2024-08-31
Payer: MEDICARE

## 2024-08-31 DIAGNOSIS — R93.1 ELEVATED CORONARY ARTERY CALCIUM SCORE: ICD-10-CM

## 2024-08-31 DIAGNOSIS — M05.711 RHEUMATOID ARTHRITIS INVOLVING BOTH SHOULDERS WITH POSITIVE RHEUMATOID FACTOR (H): ICD-10-CM

## 2024-08-31 DIAGNOSIS — R01.1 HEART MURMUR: ICD-10-CM

## 2024-08-31 DIAGNOSIS — R07.2 PRECORDIAL PAIN: ICD-10-CM

## 2024-08-31 DIAGNOSIS — M05.712 RHEUMATOID ARTHRITIS INVOLVING BOTH SHOULDERS WITH POSITIVE RHEUMATOID FACTOR (H): ICD-10-CM

## 2024-09-03 RX ORDER — ROSUVASTATIN CALCIUM 5 MG/1
2.5 TABLET, COATED ORAL DAILY
Qty: 45 TABLET | Refills: 2 | Status: SHIPPED | OUTPATIENT
Start: 2024-09-03

## 2024-09-29 ENCOUNTER — HEALTH MAINTENANCE LETTER (OUTPATIENT)
Age: 74
End: 2024-09-29

## 2024-10-10 ENCOUNTER — TELEPHONE (OUTPATIENT)
Dept: CARDIOLOGY | Facility: CLINIC | Age: 74
End: 2024-10-10

## 2024-10-10 ENCOUNTER — ALLIED HEALTH/NURSE VISIT (OUTPATIENT)
Dept: CARDIOLOGY | Facility: CLINIC | Age: 74
End: 2024-10-10
Payer: MEDICARE

## 2024-10-10 VITALS
HEIGHT: 64 IN | RESPIRATION RATE: 12 BRPM | SYSTOLIC BLOOD PRESSURE: 160 MMHG | WEIGHT: 106.6 LBS | DIASTOLIC BLOOD PRESSURE: 100 MMHG | BODY MASS INDEX: 18.2 KG/M2 | HEART RATE: 88 BPM

## 2024-10-10 DIAGNOSIS — R93.1 ELEVATED CORONARY ARTERY CALCIUM SCORE: Primary | ICD-10-CM

## 2024-10-10 PROCEDURE — 99207 PR NO CHARGE NURSE ONLY: CPT

## 2024-10-10 RX ORDER — METHOTREXATE 2.5 MG/1
2.5 TABLET ORAL
COMMUNITY

## 2024-10-10 RX ORDER — PREDNISONE 2.5 MG/1
2.5 TABLET ORAL DAILY
COMMUNITY

## 2024-10-10 RX ORDER — MAGNESIUM GLYCINATE 100 MG
200 CAPSULE ORAL DAILY
COMMUNITY

## 2024-10-10 RX ORDER — FAMCICLOVIR 500 MG/1
TABLET ORAL
COMMUNITY
Start: 2024-08-09

## 2024-10-10 RX ORDER — LEVOTHYROXINE SODIUM 125 UG/1
125 TABLET ORAL DAILY
COMMUNITY
Start: 2024-09-30

## 2024-10-10 RX ORDER — TOCILIZUMAB 20 MG/ML
400 INJECTION, SOLUTION, CONCENTRATE INTRAVENOUS
COMMUNITY

## 2024-10-10 RX ORDER — ZOLEDRONIC ACID 0.05 MG/ML
5 INJECTION, SOLUTION INTRAVENOUS ONCE
COMMUNITY

## 2024-10-10 NOTE — TELEPHONE ENCOUNTER
----- Message from Thomas Dos Santos sent at 10/10/2024  3:27 PM CDT -----  Regarding: RE: Dr. Dos Santos patient  Yes  ----- Message -----  From: Shanell Nunez RN  Sent: 10/10/2024   3:13 PM CDT  To: Thomas Dos Santos MD  Subject: RE: Dr. Dos Santos patient                          Hello ,  Just wanted to clarify-  Okay to add amlodipine 2.5mg daily and proceed with additional recommendations?  Thank you!  Shanell  ----- Message -----  From: May Patel  Sent: 10/10/2024   2:33 PM CDT  To: Shanell Nunez RN  Subject: FW: Dr. Dos Santos patient                            ----- Message -----  From: Thomas Dos Santos MD  Sent: 10/10/2024   2:30 PM CDT  To: May Patel  Subject: FW: Dr. Dos Santos patient                          May,    With and amlodipine 2.5 mg each day.  Have her call some more blood pressures.  Warned her about possible peripheral edema which is reversible if becomes an issue.    Thomas Mtz  ----- Message -----  From: Shanell Nunez RN  Sent: 10/10/2024  12:56 PM CDT  To: Thomas Dos Santos MD  Subject: FW: Dr. Dos Santos patient                          Hello ,    Per chart review from visit today pt's blood pressures running  140/88    155/95  HR 75  152/92  HR 88  160/100    164/110 HR 77  Any recommendations?    Thank you!  Shanell  ----- Message -----  From: Caroline Patel RMA  Sent: 10/10/2024  12:34 PM CDT  To: [facility] Cv Rn Team Y  Subject: Dr. Dos Santos patient                              Please see chart for BP results and nursing notes as well    Thank you     Caroline

## 2024-10-10 NOTE — NURSING NOTE
Patient brought in own machine [employer] BP right arm 164/110 P 77, patient brought in to compare. Explained to patient that I did use a smaller cuff. She can come in here anytime for BP checks, she can also go the local fire stations to get there BP checked.  Will forward her BP results onto Dr. Dos Santos and his nurse so that they may address, notified patient that provider and nurse will get back with patient and address this issue, patient is okay with the plan   ANNETTE Liu

## 2024-10-10 NOTE — TELEPHONE ENCOUNTER
Called patient-- unable to reach. Left message for patient to return call to discuss recommendations. -elio

## 2024-10-11 NOTE — TELEPHONE ENCOUNTER
Spoke with patient and updated her of 's recommendations. Pt stated she is hesitant to start amlodipine due to possible side effect of peripheral edema, stating she has to work and wears tall heels and cannot fit her feet it she has any swelling. She asked if there was an alternative therapy possible and if not if it would be necessary to increase bumex. Pt also hesitant due to interactions with her RA medications.

## 2024-10-14 ENCOUNTER — MYC MEDICAL ADVICE (OUTPATIENT)
Dept: CARDIOLOGY | Facility: CLINIC | Age: 74
End: 2024-10-14
Payer: MEDICARE

## 2024-10-14 DIAGNOSIS — I10 HTN (HYPERTENSION): Primary | ICD-10-CM

## 2024-10-14 NOTE — TELEPHONE ENCOUNTER
----- Message -----  From: Shanell Nunez RN  Sent: 10/11/2024  10:27 AM CDT  To: Thomas Dos Santos MD    ----- Message from Shanell Nunez RN sent at 10/11/2024 10:27 AM CDT -----  Helsayra Ramirez,    Please see notes. Any alternative recommendation for BP control?  Thank you!  Shanell  ----- Message -----  From: Thomas Dos Santos MD  Sent: 10/12/2024  10:25 AM CDT  To: Shanell Nunez, SAUNDRA    Losartan, 25 mg each day.  Check basic metabolic panel 1 week after starting.    Thanks,      __________________________  Left detailed message for pt with 's recommendations. Await callback. -nj

## 2024-10-15 ENCOUNTER — TELEPHONE (OUTPATIENT)
Dept: CARDIOLOGY | Facility: CLINIC | Age: 74
End: 2024-10-15
Payer: MEDICARE

## 2024-10-15 DIAGNOSIS — I10 HTN (HYPERTENSION): ICD-10-CM

## 2024-10-15 RX ORDER — VALSARTAN 80 MG/1
80 TABLET ORAL DAILY
Qty: 30 TABLET | Refills: 3 | Status: SHIPPED | OUTPATIENT
Start: 2024-10-15 | End: 2024-10-28 | Stop reason: SINTOL

## 2024-10-15 RX ORDER — VALSARTAN 80 MG/1
80 TABLET ORAL DAILY
Qty: 30 TABLET | Refills: 3 | Status: SHIPPED | OUTPATIENT
Start: 2024-10-15 | End: 2024-10-15

## 2024-10-15 RX ORDER — VALSARTAN 80 MG/1
80 TABLET ORAL DAILY
Qty: 90 TABLET | OUTPATIENT
Start: 2024-10-15

## 2024-10-15 NOTE — TELEPHONE ENCOUNTER
From: Thomas Dos Santos MD  Sent: 10/15/2024   7:22 AM CDT  To: May Patel  Subject: FW: New Medication                               Okay to order valsartan 80 mg a day.  Should get her basic metabolic and panel drawn 7 to 10 days after starting and could get the other bloods ordered at that time as well.    ThanksThomas

## 2024-10-15 NOTE — TELEPHONE ENCOUNTER
"Pt called stating Rx for Valsartan was not received by pt's pharmacy, [facility] in [place]. Writer attempted to call [facility] due to Rx showing it was sent this morning  \"Receipt confirmed by pharmacy (10/15/2024  7:58 AM CDT \".  Rx resent.-njm  "

## 2024-10-21 ENCOUNTER — TELEPHONE (OUTPATIENT)
Dept: CARDIOLOGY | Facility: CLINIC | Age: 74
End: 2024-10-21
Payer: MEDICARE

## 2024-10-21 NOTE — TELEPHONE ENCOUNTER
Patient left message 10/16 explaining she has still not been able to  Valsartan medication due to insurance. Pt also wanted to confirm lab plans. Pt has lab appt 11/16.   Per chart review- pt to have lipids and hepatic function prior to upcoming appt with . Pt is also to have BMP 7- 10 days after starting Valsartan. Per 's mychart message all labs can be completed same day.    ________________  Left detailed message for pt with instructions of when to get her 3 labs completed. Encouraged pt to call back with questions.-elio

## 2024-10-24 ENCOUNTER — TELEPHONE (OUTPATIENT)
Dept: CARDIOLOGY | Facility: CLINIC | Age: 74
End: 2024-10-24
Payer: MEDICARE

## 2024-10-24 DIAGNOSIS — I10 HTN (HYPERTENSION): Primary | ICD-10-CM

## 2024-10-24 NOTE — TELEPHONE ENCOUNTER
[facility] Call Center    Phone Message    May a detailed message be left on voicemail: yes     Reason for Call: Other: Ramonita called requesting to speak with her care team, SAUNDRA Reece or Dr. Dos Santos about her Valsartan stating she has problems while she is on the medication and would like to know if Dr. Dos Santos would recommend she go back to her original BP medication that also caused her issues or if there was another medication Dr. Dos Santos would recommend. Ramonita states she isn't currently on a BP medication since she has stopped this one and would like her teams input. Ramonita will be on a plane today so may not be available but please reach out to Ramonita to discuss. Thank you!     Action Taken: Other: Cardiology    Travel Screening: Not Applicable    Thank you!  [facility]       Date of Service:

## 2024-10-25 NOTE — TELEPHONE ENCOUNTER
Pt called stating she experienced possible symptoms associated with Valsartan-possibly interacting with her RA medications.   Stated she started taking Valsartan 10/18 and ended up stopping- took last dose Tuesday 10/22.    Pt reported on Tc 10/20 she got on a plane a 6am and did not eat breakfast and took all of her pills including the Valsartan. Thereafter pt became very nauseous, dizzy and had diarrhea, feeling unstable walking.    Pt stated after that she experienced some cramping pain- groin, thighs, shoulder, neck, and was very stiff which seemed to last a couple days.    Pt reports the last few days since stopping Valsartan feeling much better. Reports not taking any blood pressures or heart rates during episode or thereafter.    Pt not sure if it would be appropriate to try again and take it before bed after eating and then checking Bps and HR in the morning.

## 2024-10-28 DIAGNOSIS — I10 HTN (HYPERTENSION): ICD-10-CM

## 2024-10-28 RX ORDER — AMLODIPINE BESYLATE 2.5 MG/1
2.5 TABLET ORAL DAILY
Qty: 30 TABLET | Refills: 2 | Status: SHIPPED | OUTPATIENT
Start: 2024-10-28 | End: 2024-10-28

## 2024-10-28 RX ORDER — AMLODIPINE BESYLATE 2.5 MG/1
2.5 TABLET ORAL DAILY
Qty: 90 TABLET | Refills: 1 | Status: SHIPPED | OUTPATIENT
Start: 2024-10-28

## 2024-10-28 NOTE — TELEPHONE ENCOUNTER
----- Message -----  From: Shanell Nunez RN  Sent: 10/25/2024   9:06 AM CDT  To: Thomas Dos Santos MD    ----- Message from Shanell Nunez RN sent at 10/25/2024  9:06 AM CDT -----  Helsayra Dos Santos,    Please see notes and advise.    Thank you!    Shanell  ----- Message -----  From: Thomas Dos Santos MD  Sent: 10/26/2024   1:04 PM CDT  To: Shanell Nunez, RN    OK,    No more valsartan,    Would she try amlodipine 2.5 mg/day and if any swelling stop it right away?    Thanks,      __________________________  Left detailed message for patient with 's recommendations, asked pt to call back to discuss.-elio

## 2024-10-28 NOTE — TELEPHONE ENCOUNTER
Pt called back and asked that amlodipine be ordered as a 90 day supply due to her pharmacy preferences. Resent Rx as 90day supply to pt's preferred pharmacy.-elio

## 2024-10-28 NOTE — TELEPHONE ENCOUNTER
Spoke with patient and updated her of 's recommendations. Pt verbalized understanding and stated she is willing to try the amlodipine per 's recommendations. Rx sent to pt's preferred pharmacy.   Pt wanted it noted that her PCP recently lowered her levothyroxine 112mcg daily. Pt transferred to scheduling to change lab time to closer to follow-up appt. Along with her schedule constraints.-elio

## 2024-11-16 ENCOUNTER — LAB (OUTPATIENT)
Dept: LAB | Facility: CLINIC | Age: 74
End: 2024-11-16
Payer: MEDICARE

## 2024-11-16 DIAGNOSIS — R93.1 ELEVATED CORONARY ARTERY CALCIUM SCORE: ICD-10-CM

## 2024-11-16 DIAGNOSIS — I10 HTN (HYPERTENSION): ICD-10-CM

## 2024-11-16 LAB
ALBUMIN SERPL BCG-MCNC: 4.4 G/DL (ref 3.5–5.2)
ALP SERPL-CCNC: 82 U/L (ref 40–150)
ALT SERPL W P-5'-P-CCNC: 43 U/L (ref 0–50)
ANION GAP SERPL CALCULATED.3IONS-SCNC: 13 MMOL/L (ref 7–15)
AST SERPL W P-5'-P-CCNC: 53 U/L (ref 0–45)
BILIRUB DIRECT SERPL-MCNC: <0.2 MG/DL (ref 0–0.3)
BILIRUB SERPL-MCNC: 0.7 MG/DL
BUN SERPL-MCNC: 14.4 MG/DL (ref 8–23)
CALCIUM SERPL-MCNC: 10.2 MG/DL (ref 8.8–10.4)
CHLORIDE SERPL-SCNC: 104 MMOL/L (ref 98–107)
CHOLEST SERPL-MCNC: 235 MG/DL
CREAT SERPL-MCNC: 0.82 MG/DL (ref 0.51–0.95)
EGFRCR SERPLBLD CKD-EPI 2021: 75 ML/MIN/1.73M2
FASTING STATUS PATIENT QL REPORTED: ABNORMAL
GLUCOSE SERPL-MCNC: 93 MG/DL (ref 70–99)
HCO3 SERPL-SCNC: 26 MMOL/L (ref 22–29)
HDLC SERPL-MCNC: 148 MG/DL
LDLC SERPL CALC-MCNC: 76 MG/DL
NONHDLC SERPL-MCNC: 87 MG/DL
POTASSIUM SERPL-SCNC: 3.6 MMOL/L (ref 3.4–5.3)
PROT SERPL-MCNC: 6.5 G/DL (ref 6.4–8.3)
SODIUM SERPL-SCNC: 143 MMOL/L (ref 135–145)
TRIGL SERPL-MCNC: 55 MG/DL

## 2024-11-16 PROCEDURE — 36415 COLL VENOUS BLD VENIPUNCTURE: CPT

## 2024-11-16 PROCEDURE — 80053 COMPREHEN METABOLIC PANEL: CPT

## 2024-11-16 PROCEDURE — 82248 BILIRUBIN DIRECT: CPT

## 2024-11-16 PROCEDURE — 80061 LIPID PANEL: CPT

## 2024-11-18 DIAGNOSIS — R93.1 ELEVATED CORONARY ARTERY CALCIUM SCORE: Primary | ICD-10-CM

## 2024-11-19 ENCOUNTER — OFFICE VISIT (OUTPATIENT)
Dept: CARDIOLOGY | Facility: CLINIC | Age: 74
End: 2024-11-19
Payer: MEDICARE

## 2024-11-19 VITALS
DIASTOLIC BLOOD PRESSURE: 96 MMHG | RESPIRATION RATE: 12 BRPM | HEIGHT: 63 IN | HEART RATE: 76 BPM | SYSTOLIC BLOOD PRESSURE: 146 MMHG | WEIGHT: 109.6 LBS | BODY MASS INDEX: 19.42 KG/M2

## 2024-11-19 DIAGNOSIS — R93.1 ELEVATED CORONARY ARTERY CALCIUM SCORE: ICD-10-CM

## 2024-11-19 DIAGNOSIS — I10 BENIGN ESSENTIAL HYPERTENSION: Primary | ICD-10-CM

## 2024-11-19 PROCEDURE — G2211 COMPLEX E/M VISIT ADD ON: HCPCS | Performed by: INTERNAL MEDICINE

## 2024-11-19 PROCEDURE — 99214 OFFICE O/P EST MOD 30 MIN: CPT | Performed by: INTERNAL MEDICINE

## 2024-11-19 RX ORDER — PLANT STANOL ESTER 450 MG
2.5 TABLET ORAL DAILY
COMMUNITY
Start: 2024-08-09

## 2024-11-19 RX ORDER — LEVOTHYROXINE SODIUM 112 UG/1
112 TABLET ORAL DAILY
COMMUNITY
Start: 2024-11-05

## 2024-11-19 NOTE — PROGRESS NOTES
[facility]  [phone]          Assessment/Recommendations   Patient with elevated calcium score, elevated blood pressure but not much data to react to in regards to blood pressure readings.  She has not been checking it at home.  I am somewhat reticent to change her medications based on 1 blood pressure reading but suspect we will need to go up on the amlodipine.  I have asked her to get us 10 blood pressure readings over the next 10 days and vary the time of day and send them to us via [employer].  She commits to that.  I have also asked her to start a walking program or treadmill program as this could help her blood pressure as well and mitigates the necessity for additional medications or higher doses.  She will make an effort to do this as well.  She has a past history of being a big exerciser but is just gotten out of the habit and her arthritis does not help either.    She does have good control of her LDL cholesterol and very mild elevation in her AST which we will continue to follow.    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  We talked about potential strategies to cut back and she will give that some thought and discuss it with her spouse who is her .    Thank you for allowing us to participate in her care.    The longitudinal plan of care for the diagnosis(es)/condition(s) as documented were addressed during this visit. Due to the added complexity in care, I will continue to support Ramonita in the subsequent management and with ongoing continuity of care.        History of Present Illness/Subjective    Ms. Ramonita Potts is a 74 year old female with known elevated calcium score and more recently elevated blood pressure.  She has been on amlodipine but has not been checking her blood pressure at home.  She denies orthopnea, paroxysmal nocturnal dyspnea, and gets occasional brief  palpitations.  No syncopal episodes.  No chest pains.         Physical Examination Review of Systems   There were no vitals taken for this visit.  There is no height or weight on file to calculate BMI.  Wt Readings from Last 3 Encounters:   10/10/24 48.4 kg (106 lb 9.6 oz)   11/02/23 49.2 kg (108 lb 8 oz)   11/03/22 47.3 kg (104 lb 4.8 oz)     General Appearance:   Alert, cooperative and in no acute distress.   ENT/Mouth: Pink/moist oral mucosa   EYES:  no scleral icterus, normal conjunctivae   Neck: JVP normal. No Hepatojugular reflux. Thyroid not visualized.   Chest/Lungs:   Lungs are clear to auscultation, equal chest wall expansion.   Cardiovascular:   S1, S2 without murmur ,clicks or rubs. Brachial, radial and posterior tibial pulses are intact and symetric. No carotid bruits noted   Abdomen:  Nontender.    Extremities: No cyanosis, clubbing or edema   Skin: no xanthelasma, warm.    Neurologic: normal arm movement bilateral, no tremors     Psychiatric: Appropriate affect.                                                  Medical History  Surgical History Family History Social History   No past medical history on file. No past surgical history on file. No family history on file. Social History     Socioeconomic History    Marital status:      Spouse name: Not on file    Number of children: Not on file    Years of education: Not on file    Highest education level: Not on file   Occupational History    Not on file   Tobacco Use    Smoking status: Never    Smokeless tobacco: Never   Vaping Use    Vaping status: Never Used   Substance and Sexual Activity    Alcohol use: Not on file    Drug use: Not on file    Sexual activity: Not Currently     Partners: Male   Other Topics Concern    Not on file   Social History Narrative    Not on file     Social Drivers of Health     Financial Resource Strain: Low Risk  (1/26/2024)    Received from [employer] [facility]    Financial Resource Strain      Difficulty of Paying Living Expenses: 3     Difficulty of Paying Living Expenses: Not on file   Food Insecurity: No Food Insecurity (5/28/2024)    Received from [facility]    Hunger Vital Sign     Worried About Running Out of Food in the Last Year: Never true     Ran Out of Food in the Last Year: Never true   Transportation Needs: No Transportation Needs (5/28/2024)    Received from [facility]    PRAPARE - Transportation     Lack of Transportation (Medical): No     Lack of Transportation (Non-Medical): No   Physical Activity: Sufficiently Active (5/28/2024)    Received from [facility]    Exercise Vital Sign     Days of Exercise per Week: 7 days     Minutes of Exercise per Session: 60 min   Stress: Not on file   Social Connections: Socially Integrated (1/26/2024)    Received from [employer] [facility]    Social Connections     Do you often feel lonely or isolated from those around you?: 0   Interpersonal Safety: Not on file   Housing Stability: Low Risk  (5/28/2024)    Received from [facility]    Housing Stability     What is your living situation today?: I have a steady place to live          Medications  Allergies   Current Outpatient Medications   Medication Sig Dispense Refill    aluminum chloride (DRYSOL) 20 % external solution Apply topically At Bedtime      amLODIPine (NORVASC) 2.5 MG tablet TAKE 1 TABLET(2.5 MG) BY MOUTH DAILY 90 tablet 1    betamethasone dipropionate (DIPROSONE) 0.05 % external cream Apply topically daily      biotin 1000 MCG TABS tablet Take 1,000 mcg by mouth daily      bumetanide (BUMEX) 0.5 MG tablet Take 1 tablet by mouth daily      calcium carbonate-vitamin D (CALTRATE) 600-10 MG-MCG per tablet Take 1 tablet by mouth daily.      cyanocobalamin (VITAMIN B-12) 1000 MCG tablet Take 1,000 mcg by mouth daily      famciclovir (FAMVIR) 500 MG tablet As needed      folic acid (FOLVITE) 1 MG tablet Take 1 mg by mouth 2 times daily      hydroxychloroquine (PLAQUENIL)  "200 MG tablet Take 200 mg by mouth daily      levothyroxine (SYNTHROID/LEVOTHROID) 125 MCG tablet Take 125 mcg by mouth daily.      magnesium glycinate 100 MG CAPS capsule Take 200 mg by mouth daily.      methotrexate 2.5 MG tablet Take 2.5 mg by mouth every 7 days. 4 tabs once a week      Multiple Vitamins-Minerals (CENTRUM SILVER 50+WOMEN) TABS Take 1 tablet by mouth daily.      mupirocin (BACTROBAN) 2 % external ointment Apply topically as needed.      Potassium 95 MG TABS Take 90 mg by mouth daily.      predniSONE (DELTASONE) 2.5 MG tablet Take 2.5 mg by mouth daily.      rosuvastatin (CRESTOR) 5 MG tablet Take 0.5 tablets (2.5 mg) by mouth daily. 45 tablet 2    sulfaSALAzine (AZULFIDINE) 500 MG tablet Take 1,500 mg by mouth daily.      tocilizumab (ACTEMRA) 200 MG/10ML injection Inject 400 mg into the vein every 28 days.      vitamin C (ASCORBIC ACID) 1000 MG TABS Take 1,000 mg by mouth daily      zoledronic acid (RECLAST) 5 MG/100ML infusion Inject 5 mg into the vein once. Once a year      Allergies   Allergen Reactions    Acetaminophen Headache     Sinus pressure    Epinephrine Rash    Norepinephrine Palpitations    Codeine      Other Reaction(s): Other (see comments)    Other Drug Allergy (See Comments) Unknown     PTU    Penicillins Unknown    Propylthiouracil     Valtrex [Valacyclovir] Unknown    Sympathomimetics Rash         Lab Results    Chemistry/lipid CBC Cardiac Enzymes/BNP/TSH/INR   Lab Results   Component Value Date    CHOL 235 (H) 11/16/2024     11/16/2024    TRIG 55 11/16/2024    BUN 14.4 11/16/2024     11/16/2024    CO2 26 11/16/2024    No results found for: \"WBC\", \"HGB\", \"HCT\", \"MCV\", \"PLT\" No results found for: \"CKTOTAL\", \"CKMB\", \"TROPONINI\", \"BNP\", \"TSH\", \"INR\"                                         "

## 2024-11-19 NOTE — LETTER
11/19/2024    Lydia Branch MD  [address]  [facility] [address]    RE: Ramonita Potts       Dear Colleague,     I had the pleasure of seeing Ramonita Potts in the [facility].      [facility]  [phone]          Assessment/Recommendations   Patient with elevated calcium score, elevated blood pressure but not much data to react to in regards to blood pressure readings.  She has not been checking it at home.  I am somewhat reticent to change her medications based on 1 blood pressure reading but suspect we will need to go up on the amlodipine.  I have asked her to get us 10 blood pressure readings over the next 10 days and vary the time of day and send them to us via [employer].  She commits to that.  I have also asked her to start a walking program or treadmill program as this could help her blood pressure as well and mitigates the necessity for additional medications or higher doses.  She will make an effort to do this as well.  She has a past history of being a big exerciser but is just gotten out of the habit and her arthritis does not help either.    She does have good control of her LDL cholesterol and very mild elevation in her AST which we will continue to follow.    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  We talked about potential strategies to cut back and she will give that some thought and discuss it with her spouse who is her .    Thank you for allowing us to participate in her care.    The longitudinal plan of care for the diagnosis(es)/condition(s) as documented were addressed during this visit. Due to the added complexity in care, I will continue to support Ramonita in the subsequent management and with ongoing continuity of care.        History of Present Illness/Subjective    MsAba Potts is a 74 year old female with known  elevated calcium score and more recently elevated blood pressure.  She has been on amlodipine but has not been checking her blood pressure at home.  She denies orthopnea, paroxysmal nocturnal dyspnea, and gets occasional brief palpitations.  No syncopal episodes.  No chest pains.         Physical Examination Review of Systems   There were no vitals taken for this visit.  There is no height or weight on file to calculate BMI.  Wt Readings from Last 3 Encounters:   10/10/24 48.4 kg (106 lb 9.6 oz)   11/02/23 49.2 kg (108 lb 8 oz)   11/03/22 47.3 kg (104 lb 4.8 oz)     General Appearance:   Alert, cooperative and in no acute distress.   ENT/Mouth: Pink/moist oral mucosa   EYES:  no scleral icterus, normal conjunctivae   Neck: JVP normal. No Hepatojugular reflux. Thyroid not visualized.   Chest/Lungs:   Lungs are clear to auscultation, equal chest wall expansion.   Cardiovascular:   S1, S2 without murmur ,clicks or rubs. Brachial, radial and posterior tibial pulses are intact and symetric. No carotid bruits noted   Abdomen:  Nontender.    Extremities: No cyanosis, clubbing or edema   Skin: no xanthelasma, warm.    Neurologic: normal arm movement bilateral, no tremors     Psychiatric: Appropriate affect.                                                  Medical History  Surgical History Family History Social History   No past medical history on file. No past surgical history on file. No family history on file. Social History     Socioeconomic History     Marital status:      Spouse name: Not on file     Number of children: Not on file     Years of education: Not on file     Highest education level: Not on file   Occupational History     Not on file   Tobacco Use     Smoking status: Never     Smokeless tobacco: Never   Vaping Use     Vaping status: Never Used   Substance and Sexual Activity     Alcohol use: Not on file     Drug use: Not on file     Sexual activity: Not Currently     Partners: Male   Other Topics  Concern     Not on file   Social History Narrative     Not on file     Social Drivers of Health     Financial Resource Strain: Low Risk  (1/26/2024)    Received from [employer] [facility]    Financial Resource Strain      Difficulty of Paying Living Expenses: 3      Difficulty of Paying Living Expenses: Not on file   Food Insecurity: No Food Insecurity (5/28/2024)    Received from [facility]    Hunger Vital Sign      Worried About Running Out of Food in the Last Year: Never true      Ran Out of Food in the Last Year: Never true   Transportation Needs: No Transportation Needs (5/28/2024)    Received from [facility]    PRAPARE - Transportation      Lack of Transportation (Medical): No      Lack of Transportation (Non-Medical): No   Physical Activity: Sufficiently Active (5/28/2024)    Received from [facility]    Exercise Vital Sign      Days of Exercise per Week: 7 days      Minutes of Exercise per Session: 60 min   Stress: Not on file   Social Connections: Socially Integrated (1/26/2024)    Received from [employer] [facility]    Social Connections      Do you often feel lonely or isolated from those around you?: 0   Interpersonal Safety: Not on file   Housing Stability: Low Risk  (5/28/2024)    Received from [facility]    Housing Stability      What is your living situation today?: I have a steady place to live          Medications  Allergies   Current Outpatient Medications   Medication Sig Dispense Refill     aluminum chloride (DRYSOL) 20 % external solution Apply topically At Bedtime       amLODIPine (NORVASC) 2.5 MG tablet TAKE 1 TABLET(2.5 MG) BY MOUTH DAILY 90 tablet 1     betamethasone dipropionate (DIPROSONE) 0.05 % external cream Apply topically daily       biotin 1000 MCG TABS tablet Take 1,000 mcg by mouth daily       bumetanide (BUMEX) 0.5 MG tablet Take 1 tablet by mouth daily       calcium carbonate-vitamin D (CALTRATE) 600-10 MG-MCG per tablet Take 1  "tablet by mouth daily.       cyanocobalamin (VITAMIN B-12) 1000 MCG tablet Take 1,000 mcg by mouth daily       famciclovir (FAMVIR) 500 MG tablet As needed       folic acid (FOLVITE) 1 MG tablet Take 1 mg by mouth 2 times daily       hydroxychloroquine (PLAQUENIL) 200 MG tablet Take 200 mg by mouth daily       levothyroxine (SYNTHROID/LEVOTHROID) 125 MCG tablet Take 125 mcg by mouth daily.       magnesium glycinate 100 MG CAPS capsule Take 200 mg by mouth daily.       methotrexate 2.5 MG tablet Take 2.5 mg by mouth every 7 days. 4 tabs once a week       Multiple Vitamins-Minerals (CENTRUM SILVER 50+WOMEN) TABS Take 1 tablet by mouth daily.       mupirocin (BACTROBAN) 2 % external ointment Apply topically as needed.       Potassium 95 MG TABS Take 90 mg by mouth daily.       predniSONE (DELTASONE) 2.5 MG tablet Take 2.5 mg by mouth daily.       rosuvastatin (CRESTOR) 5 MG tablet Take 0.5 tablets (2.5 mg) by mouth daily. 45 tablet 2     sulfaSALAzine (AZULFIDINE) 500 MG tablet Take 1,500 mg by mouth daily.       tocilizumab (ACTEMRA) 200 MG/10ML injection Inject 400 mg into the vein every 28 days.       vitamin C (ASCORBIC ACID) 1000 MG TABS Take 1,000 mg by mouth daily       zoledronic acid (RECLAST) 5 MG/100ML infusion Inject 5 mg into the vein once. Once a year      Allergies   Allergen Reactions     Acetaminophen Headache     Sinus pressure     Epinephrine Rash     Norepinephrine Palpitations     Codeine      Other Reaction(s): Other (see comments)     Other Drug Allergy (See Comments) Unknown     PTU     Penicillins Unknown     Propylthiouracil      Valtrex [Valacyclovir] Unknown     Sympathomimetics Rash         Lab Results    Chemistry/lipid CBC Cardiac Enzymes/BNP/TSH/INR   Lab Results   Component Value Date    CHOL 235 (H) 11/16/2024     11/16/2024    TRIG 55 11/16/2024    BUN 14.4 11/16/2024     11/16/2024    CO2 26 11/16/2024    No results found for: \"WBC\", \"HGB\", \"HCT\", \"MCV\", \"PLT\" No results " "found for: \"CKTOTAL\", \"CKMB\", \"TROPONINI\", \"BNP\", \"TSH\", \"INR\"                                             Thank you for allowing me to participate in the care of your patient.      Sincerely,     Thomas Dos Santos MD     [facility]  cc:   Thomas DosS antos MD  [address]  [place] [address]      "

## 2024-12-08 ENCOUNTER — HEALTH MAINTENANCE LETTER (OUTPATIENT)
Age: 74
End: 2024-12-08

## 2025-01-09 ENCOUNTER — MYC REFILL (OUTPATIENT)
Dept: CARDIOLOGY | Facility: CLINIC | Age: 75
End: 2025-01-09
Payer: MEDICARE

## 2025-01-09 DIAGNOSIS — I10 HTN (HYPERTENSION): ICD-10-CM

## 2025-01-13 RX ORDER — AMLODIPINE BESYLATE 2.5 MG/1
2.5 TABLET ORAL DAILY
Qty: 90 TABLET | Refills: 2 | Status: SHIPPED | OUTPATIENT
Start: 2025-01-13

## 2025-01-15 ENCOUNTER — LAB REQUISITION (OUTPATIENT)
Dept: LAB | Facility: CLINIC | Age: 75
End: 2025-01-15
Payer: MEDICARE

## 2025-01-15 DIAGNOSIS — D48.5 NEOPLASM OF UNCERTAIN BEHAVIOR OF SKIN: ICD-10-CM

## 2025-01-15 PROCEDURE — 88305 TISSUE EXAM BY PATHOLOGIST: CPT | Mod: TC,ORL | Performed by: DERMATOLOGY

## 2025-01-15 PROCEDURE — 88305 TISSUE EXAM BY PATHOLOGIST: CPT | Mod: 26 | Performed by: DERMATOLOGY

## 2025-03-02 ENCOUNTER — MYC REFILL (OUTPATIENT)
Dept: CARDIOLOGY | Facility: CLINIC | Age: 75
End: 2025-03-02
Payer: MEDICARE

## 2025-03-02 DIAGNOSIS — R07.2 PRECORDIAL PAIN: ICD-10-CM

## 2025-03-02 DIAGNOSIS — R93.1 ELEVATED CORONARY ARTERY CALCIUM SCORE: ICD-10-CM

## 2025-03-02 DIAGNOSIS — M05.712 RHEUMATOID ARTHRITIS INVOLVING BOTH SHOULDERS WITH POSITIVE RHEUMATOID FACTOR (H): ICD-10-CM

## 2025-03-02 DIAGNOSIS — R01.1 HEART MURMUR: ICD-10-CM

## 2025-03-02 DIAGNOSIS — M05.711 RHEUMATOID ARTHRITIS INVOLVING BOTH SHOULDERS WITH POSITIVE RHEUMATOID FACTOR (H): ICD-10-CM

## 2025-03-04 RX ORDER — ROSUVASTATIN CALCIUM 5 MG/1
2.5 TABLET, COATED ORAL DAILY
Qty: 45 TABLET | Refills: 1 | Status: SHIPPED | OUTPATIENT
Start: 2025-03-04

## 2025-03-11 ENCOUNTER — TELEPHONE (OUTPATIENT)
Dept: CARDIOLOGY | Facility: CLINIC | Age: 75
End: 2025-03-11
Payer: MEDICARE

## 2025-03-11 NOTE — TELEPHONE ENCOUNTER
[facility] Call Center    Phone Message    May a detailed message be left on voicemail: yes     Reason for Call: Other: Ramonita called requesting to speak with her care team about her rosuvastatin. Ramonita states [facility] still hasn't received her prescription and is now saying they have never had it on file for her. Ramonita also states her  went to check on her prescriptions at 930 this morning 3/11 and they still weren't available. Ramonita is down to 5 pills and is leaving on a business trip on Sunday and will need her medication. Please review and reach out to Ramonita to figure out how to get her prescription filled. Thank you!     Action Taken: Other: Cardiology    Travel Screening: Not Applicable    Thank you!  [facility]       Date of Service:

## 2025-03-11 NOTE — TELEPHONE ENCOUNTER
Spoke with pharmacist was advised the rosuvastatin was too soon to fill initially and then pharmacist resubmitted the request and it is not too soon to fill and will process the medication today/. Left detailed message for Pt regarding the above-edmund

## 2025-04-05 ENCOUNTER — MYC MEDICAL ADVICE (OUTPATIENT)
Dept: CARDIOLOGY | Facility: CLINIC | Age: 75
End: 2025-04-05
Payer: MEDICARE

## 2025-04-05 DIAGNOSIS — I10 HTN (HYPERTENSION): ICD-10-CM

## 2025-04-07 RX ORDER — AMLODIPINE BESYLATE 2.5 MG/1
2.5 TABLET ORAL DAILY
Qty: 90 TABLET | Refills: 1 | Status: SHIPPED | OUTPATIENT
Start: 2025-04-07

## 2025-06-09 ENCOUNTER — TELEPHONE (OUTPATIENT)
Dept: CARDIOLOGY | Facility: CLINIC | Age: 75
End: 2025-06-09
Payer: MEDICARE

## 2025-06-09 DIAGNOSIS — M05.712 RHEUMATOID ARTHRITIS INVOLVING BOTH SHOULDERS WITH POSITIVE RHEUMATOID FACTOR (H): ICD-10-CM

## 2025-06-09 DIAGNOSIS — R01.1 HEART MURMUR: ICD-10-CM

## 2025-06-09 DIAGNOSIS — M05.711 RHEUMATOID ARTHRITIS INVOLVING BOTH SHOULDERS WITH POSITIVE RHEUMATOID FACTOR (H): ICD-10-CM

## 2025-06-09 DIAGNOSIS — R07.2 PRECORDIAL PAIN: ICD-10-CM

## 2025-06-09 DIAGNOSIS — R93.1 ELEVATED CORONARY ARTERY CALCIUM SCORE: ICD-10-CM

## 2025-06-09 NOTE — TELEPHONE ENCOUNTER
[facility] Call Center    Phone Message    May a detailed message be left on voicemail: yes     Reason for Call: Medication Refill Request    Has the patient contacted the pharmacy for the refill? Yes   Name of medication being requested: rosuvastatin (CRESTOR) 5 MG tablet   Provider who prescribed the medication:   Pharmacy: [id number] [place], [place] - [address] AT [facility]    Date medication is needed: 6/25/25   Call in and place on hold     Action Taken: Other: cardiology    Travel Screening: Not Applicable  Thank you!  [facility]       Date of Service:

## 2025-06-10 RX ORDER — ROSUVASTATIN CALCIUM 5 MG/1
2.5 TABLET, COATED ORAL DAILY
Qty: 45 TABLET | Refills: 0 | Status: SHIPPED | OUTPATIENT
Start: 2025-06-10

## 2025-06-15 ENCOUNTER — TRANSFERRED RECORDS (OUTPATIENT)
Dept: HEALTH INFORMATION MANAGEMENT | Facility: CLINIC | Age: 75
End: 2025-06-15
Payer: MEDICARE

## 2025-06-30 ENCOUNTER — TELEPHONE (OUTPATIENT)
Dept: CARDIOLOGY | Facility: CLINIC | Age: 75
End: 2025-06-30
Payer: MEDICARE

## 2025-06-30 DIAGNOSIS — R06.09 DYSPNEA ON EXERTION: ICD-10-CM

## 2025-06-30 DIAGNOSIS — I10 BENIGN ESSENTIAL HYPERTENSION: ICD-10-CM

## 2025-06-30 DIAGNOSIS — R42 DIZZINESS: Primary | ICD-10-CM

## 2025-06-30 NOTE — TELEPHONE ENCOUNTER
Spoke with patient, she notes she had an episode on [place]15 at the airport where she felt dizzy and passed out twice (did not hit head) and EMS came, her blood pressure was low - reports systolic in 50s or 60s- then notes they got it up to 90/~ then gave her the option to be brought to hospital or fly to her destination. Pt decided to catch next flight. Pt notes she then had some episodes of feeling dizzy on the plane and put her head between her legs.     Pt then states she has been feeling fine since up until yesterday- states she went to her RA infusion appt, he rbp was 114/60 then 90/60 then she left. Pt notes a slight headache and some dizziness, decided to take it easy and sit down, ate a protein bar- states it lasted about 1-1.5hours.    Pt notes today she is feeling fine, denies dizziness, notes she is concerned about the two episodes, states she continues to take her amlodipine 2.5mg dialy, and bumex 0.5mg daily.   Pt states she is not great at keeping herself hydrated.   Advised patient to stay hydrated and to touch base with PCP regarding hx of endocrine disorder.   Explained will set her up for RAC appt.   Advised if worsening symptoms: near fainting/fainting or low bp like reported to be evaluated at the hosptial urgently. Pt verbalized understanding and agrees to plan.-elio

## 2025-06-30 NOTE — TELEPHONE ENCOUNTER
Called patient-- unable to reach. Left message for patient to return call to discuss symptoms. Left Direct Call Back number [phone].-elio

## 2025-06-30 NOTE — TELEPHONE ENCOUNTER
Health Call Center    Phone Message    May a detailed message be left on voicemail: yes     Reason for Call: Symptoms or Concerns     If patient has red-flag symptoms, warm transfer to triage line    Current symptom or concern: BP dropping and episode of fainting.    Patient has noted over the last 2 weeks that her BP has been dropping & she did have an episode at the airport where she fainted; ambulance was called, but she ended up flying. She would like to talk with a nurse regarding the symptoms. Please reach out to her at [phone].     Action Taken: Other: Cardiology     Travel Screening: Not Applicable     Thank you!  [facility]

## 2025-07-01 NOTE — TELEPHONE ENCOUNTER
Spoke with patient and updated her of 's recommendations. Pt verbalized understanding. Orders placed for CMP and CBC. Pt transferred to scheduling to set up lab appt and notes needs to move RAC appt due to conflicting scheduling issue.-elio  _________________  ----- Message -----  From: Thomas Dos Santos MD  Sent: 6/30/2025   5:25 PM CDT  To: SAUNDRA White,    Could she come in and get a complete metabolic panel and a CBC.  Could she hold her Bumex for a couple of days and then take it every other day to see if she tolerates that and if she feels better?    Thomas Buck  ----- Message -----  From: Shanell Nunez RN  Sent: 6/30/2025   5:01 PM CDT  To: Thomas Dos Santos MD    ----- Message from Shanell Nunez RN sent at 6/30/2025  5:01 PM CDT -----    Pt set up for RAC, 7/15. Any further recommendations at this time?  Thank you.  -elio

## 2025-07-03 ENCOUNTER — RESULTS FOLLOW-UP (OUTPATIENT)
Dept: CARDIOLOGY | Facility: CLINIC | Age: 75
End: 2025-07-03

## 2025-07-03 ENCOUNTER — LAB (OUTPATIENT)
Dept: CARDIOLOGY | Facility: CLINIC | Age: 75
End: 2025-07-03
Payer: MEDICARE

## 2025-07-03 DIAGNOSIS — I10 BENIGN ESSENTIAL HYPERTENSION: ICD-10-CM

## 2025-07-03 DIAGNOSIS — R42 DIZZINESS: ICD-10-CM

## 2025-07-03 DIAGNOSIS — R06.09 DYSPNEA ON EXERTION: ICD-10-CM

## 2025-07-03 LAB
ALBUMIN SERPL BCG-MCNC: 4.6 G/DL (ref 3.5–5.2)
ALP SERPL-CCNC: 72 U/L (ref 40–150)
ALT SERPL W P-5'-P-CCNC: 41 U/L (ref 0–50)
ANION GAP SERPL CALCULATED.3IONS-SCNC: 11 MMOL/L (ref 7–15)
AST SERPL W P-5'-P-CCNC: 47 U/L (ref 0–45)
BILIRUB SERPL-MCNC: 0.7 MG/DL
BUN SERPL-MCNC: 15 MG/DL (ref 8–23)
CALCIUM SERPL-MCNC: 10.4 MG/DL (ref 8.8–10.4)
CHLORIDE SERPL-SCNC: 101 MMOL/L (ref 98–107)
CREAT SERPL-MCNC: 0.77 MG/DL (ref 0.51–0.95)
EGFRCR SERPLBLD CKD-EPI 2021: 80 ML/MIN/1.73M2
ERYTHROCYTE [DISTWIDTH] IN BLOOD BY AUTOMATED COUNT: 13.4 % (ref 10–15)
GLUCOSE SERPL-MCNC: 95 MG/DL (ref 70–99)
HCO3 SERPL-SCNC: 27 MMOL/L (ref 22–29)
HCT VFR BLD AUTO: 40.6 % (ref 35–47)
HGB BLD-MCNC: 13.8 G/DL (ref 11.7–15.7)
MCH RBC QN AUTO: 35.2 PG (ref 26.5–33)
MCHC RBC AUTO-ENTMCNC: 34 G/DL (ref 31.5–36.5)
MCV RBC AUTO: 104 FL (ref 78–100)
PLATELET # BLD AUTO: 169 10E3/UL (ref 150–450)
POTASSIUM SERPL-SCNC: 4.3 MMOL/L (ref 3.4–5.3)
PROT SERPL-MCNC: 6.6 G/DL (ref 6.4–8.3)
RBC # BLD AUTO: 3.92 10E6/UL (ref 3.8–5.2)
SODIUM SERPL-SCNC: 139 MMOL/L (ref 135–145)
WBC # BLD AUTO: 4.6 10E3/UL (ref 4–11)

## 2025-07-03 PROCEDURE — 36415 COLL VENOUS BLD VENIPUNCTURE: CPT

## 2025-07-03 PROCEDURE — 85027 COMPLETE CBC AUTOMATED: CPT

## 2025-07-03 PROCEDURE — 80053 COMPREHEN METABOLIC PANEL: CPT

## 2025-07-15 ENCOUNTER — OFFICE VISIT (OUTPATIENT)
Dept: CARDIOLOGY | Facility: CLINIC | Age: 75
End: 2025-07-15
Payer: MEDICARE

## 2025-07-15 VITALS
WEIGHT: 108 LBS | SYSTOLIC BLOOD PRESSURE: 153 MMHG | BODY MASS INDEX: 19.13 KG/M2 | DIASTOLIC BLOOD PRESSURE: 95 MMHG | HEART RATE: 79 BPM | RESPIRATION RATE: 16 BRPM

## 2025-07-15 DIAGNOSIS — R93.1 ELEVATED CORONARY ARTERY CALCIUM SCORE: ICD-10-CM

## 2025-07-15 DIAGNOSIS — R55 VASOVAGAL SYNCOPE: Primary | ICD-10-CM

## 2025-07-15 DIAGNOSIS — I10 WHITE COAT SYNDROME WITH DIAGNOSIS OF HYPERTENSION: ICD-10-CM

## 2025-07-15 PROCEDURE — G2211 COMPLEX E/M VISIT ADD ON: HCPCS | Performed by: STUDENT IN AN ORGANIZED HEALTH CARE EDUCATION/TRAINING PROGRAM

## 2025-07-15 PROCEDURE — 3077F SYST BP >= 140 MM HG: CPT | Performed by: STUDENT IN AN ORGANIZED HEALTH CARE EDUCATION/TRAINING PROGRAM

## 2025-07-15 PROCEDURE — 99214 OFFICE O/P EST MOD 30 MIN: CPT | Performed by: STUDENT IN AN ORGANIZED HEALTH CARE EDUCATION/TRAINING PROGRAM

## 2025-07-15 PROCEDURE — 3080F DIAST BP >= 90 MM HG: CPT | Performed by: STUDENT IN AN ORGANIZED HEALTH CARE EDUCATION/TRAINING PROGRAM

## 2025-07-15 RX ORDER — LYSINE HCL 500 MG
1 TABLET ORAL DAILY
COMMUNITY

## 2025-07-15 NOTE — LETTER
7/15/2025    Lydia Branch MD  [address]  [facility] [address]    RE: Ramonita Potts       Dear Colleague,     I had the pleasure of seeing Ramonita Potts in the [facility].    [facility]   [address]  [place] [address]   [email or web address]   OFFICE: [phone]     CARDIOLOGY CLINIC NOTE     Thank you, Dr. Branch, Lydia CROWE, for asking the [facility] team to see Ms. Ramonita Potts to evaluate Urgent         History of Present Illness   Ms. Ramonita Potts is a 75 year old female with a significant past history of HTN, white coat effect, elevated CAC,  who presents for evaluation of syncope.  The patient notes she was in the airport for an early flight.  She was standing in line to board when she began to feel lightheaded.  She ended up passing out.  She was assisted to her feet and then passed out again.  EMT saw her and got an ECG which showed NSR.  BP were low.  She notes multiple similar episodes in the past going back years.  Mayn episodes she was able to abort by sitting down or laying down.  She has been on bumex for decades.  This was started initially by an endocrinologist in conjunction with Grave's disease and had been continued.  She was recently instructed to move it to every other day which she has done well with.  She did notice some mild swelling in her ankle last night that resolved by the morning.         Medications  Allergies   Current Outpatient Medications   Medication Sig Dispense Refill     aluminum chloride (DRYSOL) 20 % external solution Apply topically At Bedtime       amLODIPine (NORVASC) 2.5 MG tablet Take 1 tablet (2.5 mg) by mouth daily. 90 tablet 1     betamethasone dipropionate (DIPROSONE) 0.05 % external cream Apply topically daily       biotin 1000 MCG TABS tablet Take 1,000 mcg by mouth daily       bumetanide (BUMEX) 0.5 MG tablet  Take 1 tablet by mouth daily (Patient taking differently: Take 1 tablet by mouth daily. Every other day)       Calcium Carbonate-Vit D-Min (CALCIUM 600+D PLUS MINERALS) 600-400 MG-UNIT TABS Take 1 tablet by mouth daily.       cyanocobalamin (VITAMIN B-12) 1000 MCG tablet Take 1,000 mcg by mouth daily       famciclovir (FAMVIR) 500 MG tablet Take 500 mg by mouth as needed. As needed       folic acid (FOLVITE) 1 MG tablet Take 1 mg by mouth 2 times daily       hydroxychloroquine (PLAQUENIL) 200 MG tablet Take 200 mg by mouth daily       levothyroxine (SYNTHROID/LEVOTHROID) 112 MCG tablet Take 112 mcg by mouth daily.       magnesium glycinate 100 MG CAPS capsule Take 200 mg by mouth daily.       methotrexate 2.5 MG tablet Take 2.5 mg by mouth every 7 days. 4 tabs once a week       Multiple Vitamins-Minerals (CENTRUM SILVER 50+WOMEN) TABS Take 1 tablet by mouth daily.       mupirocin (BACTROBAN) 2 % external ointment Apply topically as needed.       potassium gluconate 2.5 MEQ tablet Take 2.5 mEq by mouth daily. 90 mg       predniSONE (DELTASONE) 2.5 MG tablet Take 2.5 mg by mouth daily.       rosuvastatin (CRESTOR) 5 MG tablet Take 0.5 tablets (2.5 mg) by mouth daily. 45 tablet 0     sulfaSALAzine (AZULFIDINE) 500 MG tablet Take 1,000 mg by mouth daily.       tocilizumab (ACTEMRA) 200 MG/10ML injection Inject 400 mg into the vein every 28 days.       vitamin C (ASCORBIC ACID) 1000 MG TABS Take 1,000 mg by mouth daily       zoledronic acid (RECLAST) 5 MG/100ML infusion Inject 5 mg into the vein once. Once a year        Allergies   Allergen Reactions     Acetaminophen Headache     Sinus pressure     Epinephrine Rash     Norepinephrine Palpitations     Codeine      Other Reaction(s): Other (see comments)     Other Drug Allergy (See Comments) Unknown     PTU     Penicillins Unknown     Propylthiouracil      Valtrex [Valacyclovir] Unknown     Sympathomimetics Rash        Physical Examination Review of Systems   Vitals: BP  (!) 153/95 (BP Location: Right arm, Patient Position: Sitting, Cuff Size: Adult Small)   Pulse 79   Resp 16   Wt 49 kg (108 lb)   BMI 19.13 kg/m    BMI= Body mass index is 19.13 kg/m .  Wt Readings from Last 3 Encounters:   07/15/25 49 kg (108 lb)   11/19/24 49.7 kg (109 lb 9.6 oz)   10/10/24 48.4 kg (106 lb 9.6 oz)       General: pleasant female. No acute distress.   Neck: No JVD  Lungs: clear to auscultation  COR:  regular rate and rhythm, No murmurs, rubs, or gallops  Extrem: No edema        Please refer above for cardiac ROS details.       Past History     Family History: No family history on file.     Social History:   Social History     Socioeconomic History     Marital status:      Spouse name: Not on file     Number of children: Not on file     Years of education: Not on file     Highest education level: Not on file   Occupational History     Not on file   Tobacco Use     Smoking status: Never     Smokeless tobacco: Never   Vaping Use     Vaping status: Never Used   Substance and Sexual Activity     Alcohol use: Not on file     Drug use: Not on file     Sexual activity: Not Currently     Partners: Male   Other Topics Concern     Not on file   Social History Narrative     Not on file     Social Drivers of Health     Financial Resource Strain: Low Risk  (1/26/2024)    Received from [facility]    Financial Resource Strain      Difficulty of Paying Living Expenses: 3      Difficulty of Paying Living Expenses: Not on file   Food Insecurity: No Food Insecurity (5/28/2024)    Received from [facility]    Hunger Vital Sign      Worried About Running Out of Food in the Last Year: Never true      Ran Out of Food in the Last Year: Never true   Transportation Needs: No Transportation Needs (5/28/2024)    Received from [facility]    PRAPARE - Transportation      Lack of Transportation (Medical): No      Lack of Transportation (Non-Medical): No   Physical Activity:  "Sufficiently Active (5/28/2024)    Received from [facility]    Exercise Vital Sign      Days of Exercise per Week: 7 days      Minutes of Exercise per Session: 60 min   Stress: Not on file   Social Connections: Socially Integrated (1/26/2024)    Received from [facility]    Social Connections      Do you often feel lonely or isolated from those around you?: 0   Interpersonal Safety: Not on file   Housing Stability: Low Risk  (5/28/2024)    Received from [facility]    Housing Stability      What is your living situation today?: I have a steady place to live            Lab Results    Chemistry/lipid CBC Cardiac Enzymes/BNP/TSH/INR   Lab Results   Component Value Date    CHOL 235 (H) 11/16/2024     11/16/2024    TRIG 55 11/16/2024    BUN 15.0 07/03/2025     07/03/2025    CO2 27 07/03/2025    Lab Results   Component Value Date    WBC 4.6 07/03/2025    HGB 13.8 07/03/2025    HCT 40.6 07/03/2025     (H) 07/03/2025     07/03/2025    No results found for: \"CKTOTAL\", \"CKMB\", \"TROPONINI\", \"BNP\", \"TSH\", \"INR\"       Cardiac Problems and Cardiac Diagnostics     Most Recent Cardiac testing:  ECHO Stress 11/28/2022  Interpretation Summary  This was a normal stress echocardiogram with no evidence of stress-induced  ischemia.  This was a normal stress EKG with no evidence of stress-induced ischemia.  No arrhythmia noted.  A moderately-high workload was achieved.    Cardiac CAC 9/6/2022:  Calcium Score:   Left main = 21   Left anterior descending = 0   Left circumflex = 0   Right coronary artery = 0     Total calcium score = 21          Assessment/Recommendations   Assessment:    Ms. Ramonita Potts is a 75 year old female with a significant past history of HTN, white coat effect, elevated CAC,  who presents for evaluation of syncope.     Vasovagal syncope   - Most likely exacerbated by dehydration   - Discussed mechanism and importance of maintaining hydration. "  Shift weight when standing for prolonged periods   - Will look to stop bumex in a tapered fashion   - TTE to evaluate heart structure and function     HTN   - Likely some component of white coat effect   - She will check BP at home and send me a log in 1 week   - Cont amlodipine for now   - Consider swap to a mild diuretic like hydrochlorothiazide depending on symptoms and numbers    Elevated CAC   - LDL 76, continue low dose rosuvastatin    RTC 4 months    The longitudinal plan of care for the diagnosis(es)/condition(s) as documented were addressed during this visit. Due to the added complexity in care, I will continue to support Ramonita in the subsequent management and with ongoing continuity of care.           aFrhad De Anda DO [facility]  Non-invasive Cardiologist  [facility]         Thank you for allowing me to participate in the care of your patient.      Sincerely,     Farhad De Anda DO     [facility]  cc:   Referred Self, MD  No address on file

## 2025-07-15 NOTE — PROGRESS NOTES
[facility]   [address]  [place] [address]   [email or web address]   OFFICE: [phone]     CARDIOLOGY CLINIC NOTE     Thank you, Lydia Mckeon, for asking the [facility] team to see Ms. Ramonita Potts to evaluate Urgent         History of Present Illness   Ms. Ramonita Potts is a 75 year old female with a significant past history of HTN, white coat effect, elevated CAC,  who presents for evaluation of syncope.  The patient notes she was in the airport for an early flight.  She was standing in line to board when she began to feel lightheaded.  She ended up passing out.  She was assisted to her feet and then passed out again.  EMT saw her and got an ECG which showed NSR.  BP were low.  She notes multiple similar episodes in the past going back years.  Mayn episodes she was able to abort by sitting down or laying down.  She has been on bumex for decades.  This was started initially by an endocrinologist in conjunction with Grave's disease and had been continued.  She was recently instructed to move it to every other day which she has done well with.  She did notice some mild swelling in her ankle last night that resolved by the morning.         Medications  Allergies   Current Outpatient Medications   Medication Sig Dispense Refill    aluminum chloride (DRYSOL) 20 % external solution Apply topically At Bedtime      amLODIPine (NORVASC) 2.5 MG tablet Take 1 tablet (2.5 mg) by mouth daily. 90 tablet 1    betamethasone dipropionate (DIPROSONE) 0.05 % external cream Apply topically daily      biotin 1000 MCG TABS tablet Take 1,000 mcg by mouth daily      bumetanide (BUMEX) 0.5 MG tablet Take 1 tablet by mouth daily (Patient taking differently: Take 1 tablet by mouth daily. Every other day)      Calcium Carbonate-Vit D-Min (CALCIUM 600+D PLUS MINERALS) 600-400 MG-UNIT TABS Take 1 tablet by mouth daily.      cyanocobalamin  (VITAMIN B-12) 1000 MCG tablet Take 1,000 mcg by mouth daily      famciclovir (FAMVIR) 500 MG tablet Take 500 mg by mouth as needed. As needed      folic acid (FOLVITE) 1 MG tablet Take 1 mg by mouth 2 times daily      hydroxychloroquine (PLAQUENIL) 200 MG tablet Take 200 mg by mouth daily      levothyroxine (SYNTHROID/LEVOTHROID) 112 MCG tablet Take 112 mcg by mouth daily.      magnesium glycinate 100 MG CAPS capsule Take 200 mg by mouth daily.      methotrexate 2.5 MG tablet Take 2.5 mg by mouth every 7 days. 4 tabs once a week      Multiple Vitamins-Minerals (CENTRUM SILVER 50+WOMEN) TABS Take 1 tablet by mouth daily.      mupirocin (BACTROBAN) 2 % external ointment Apply topically as needed.      potassium gluconate 2.5 MEQ tablet Take 2.5 mEq by mouth daily. 90 mg      predniSONE (DELTASONE) 2.5 MG tablet Take 2.5 mg by mouth daily.      rosuvastatin (CRESTOR) 5 MG tablet Take 0.5 tablets (2.5 mg) by mouth daily. 45 tablet 0    sulfaSALAzine (AZULFIDINE) 500 MG tablet Take 1,000 mg by mouth daily.      tocilizumab (ACTEMRA) 200 MG/10ML injection Inject 400 mg into the vein every 28 days.      vitamin C (ASCORBIC ACID) 1000 MG TABS Take 1,000 mg by mouth daily      zoledronic acid (RECLAST) 5 MG/100ML infusion Inject 5 mg into the vein once. Once a year        Allergies   Allergen Reactions    Acetaminophen Headache     Sinus pressure    Epinephrine Rash    Norepinephrine Palpitations    Codeine      Other Reaction(s): Other (see comments)    Other Drug Allergy (See Comments) Unknown     PTU    Penicillins Unknown    Propylthiouracil     Valtrex [Valacyclovir] Unknown    Sympathomimetics Rash        Physical Examination Review of Systems   Vitals: BP (!) 153/95 (BP Location: Right arm, Patient Position: Sitting, Cuff Size: Adult Small)   Pulse 79   Resp 16   Wt 49 kg (108 lb)   BMI 19.13 kg/m    BMI= Body mass index is 19.13 kg/m .  Wt Readings from Last 3 Encounters:   07/15/25 49 kg (108 lb)   11/19/24  49.7 kg (109 lb 9.6 oz)   10/10/24 48.4 kg (106 lb 9.6 oz)       General: pleasant female. No acute distress.   Neck: No JVD  Lungs: clear to auscultation  COR:  regular rate and rhythm, No murmurs, rubs, or gallops  Extrem: No edema        Please refer above for cardiac ROS details.       Past History     Family History: No family history on file.     Social History:   Social History     Socioeconomic History    Marital status:      Spouse name: Not on file    Number of children: Not on file    Years of education: Not on file    Highest education level: Not on file   Occupational History    Not on file   Tobacco Use    Smoking status: Never    Smokeless tobacco: Never   Vaping Use    Vaping status: Never Used   Substance and Sexual Activity    Alcohol use: Not on file    Drug use: Not on file    Sexual activity: Not Currently     Partners: Male   Other Topics Concern    Not on file   Social History Narrative    Not on file     Social Drivers of Health     Financial Resource Strain: Low Risk  (1/26/2024)    Received from [facility]    Financial Resource Strain     Difficulty of Paying Living Expenses: 3     Difficulty of Paying Living Expenses: Not on file   Food Insecurity: No Food Insecurity (5/28/2024)    Received from [facility]    Hunger Vital Sign     Worried About Running Out of Food in the Last Year: Never true     Ran Out of Food in the Last Year: Never true   Transportation Needs: No Transportation Needs (5/28/2024)    Received from [facility]    PRAPARE - Transportation     Lack of Transportation (Medical): No     Lack of Transportation (Non-Medical): No   Physical Activity: Sufficiently Active (5/28/2024)    Received from [facility]    Exercise Vital Sign     Days of Exercise per Week: 7 days     Minutes of Exercise per Session: 60 min   Stress: Not on file   Social Connections: Socially Integrated (1/26/2024)    Received from [facility]    Social Connections     Do you often feel lonely or isolated from those around you?: 0   Interpersonal Safety: Not on file   Housing Stability: Low Risk  (5/28/2024)    Received from [facility]    Housing Stability     What is your living situation today?: I have a steady place to live            Lab Results    Chemistry/lipid CBC Cardiac Enzymes/BNP/TSH/INR   Lab Results   Component Value Date    CHOL 235 (H) 11/16/2024     11/16/2024    TRIG 55 11/16/2024    BUN 15.0 07/03/2025     07/03/2025    CO2 27 07/03/2025    Lab Results   Component Value Date    WBC 4.6 07/03/2025    HGB 13.8 07/03/2025    HCT 40.6 07/03/2025     (H) 07/03/2025     07/03/2025    No results found for: \"CKTOTAL\", \"CKMB\", \"TROPONINI\", \"BNP\", \"TSH\", \"INR\"       Cardiac Problems and Cardiac Diagnostics     Most Recent Cardiac testing:  ECHO Stress 11/28/2022  Interpretation Summary  This was a normal stress echocardiogram with no evidence of stress-induced  ischemia.  This was a normal stress EKG with no evidence of stress-induced ischemia.  No arrhythmia noted.  A moderately-high workload was achieved.    Cardiac CAC 9/6/2022:  Calcium Score:   Left main = 21   Left anterior descending = 0   Left circumflex = 0   Right coronary artery = 0     Total calcium score = 21          Assessment/Recommendations   Assessment:    Ms. Ramonita Potts is a 75 year old female with a significant past history of HTN, white coat effect, elevated CAC,  who presents for evaluation of syncope.     Vasovagal syncope   - Most likely exacerbated by dehydration   - Discussed mechanism and importance of maintaining hydration.  Shift weight when standing for prolonged periods   - Will look to stop bumex in a tapered fashion   - TTE to evaluate heart structure and function     HTN   - Likely some component of white coat effect   - She will check BP at home and send me a log in 1 week   - Cont amlodipine for now   - " Consider swap to a mild diuretic like hydrochlorothiazide depending on symptoms and numbers    Elevated CAC   - LDL 76, continue low dose rosuvastatin    RTC 4 months    The longitudinal plan of care for the diagnosis(es)/condition(s) as documented were addressed during this visit. Due to the added complexity in care, I will continue to support Ramonita in the subsequent management and with ongoing continuity of care.           Farhad De Anda DO [facility]  Non-invasive Cardiologist  [facility]

## 2025-07-15 NOTE — PATIENT INSTRUCTIONS
It was a pleasure meeting you today    In summary:    You have a problem called vasovagal syncope which can be exacerbated by dehydration.  We will get an echocardiogram to make sure there are no other underlying issues.  Start to wean your bumex.    Please call my nurse Francis Lara at [phone] if you have any questions or issues.    We will schedule a follow up visit in  4 months      Farhad De Anda DO Located within [facility]  Non-invasive Cardiologist  [facility]

## 2025-08-26 ENCOUNTER — HOSPITAL ENCOUNTER (OUTPATIENT)
Dept: CARDIOLOGY | Facility: CLINIC | Age: 75
Discharge: HOME | End: 2025-08-26
Attending: STUDENT IN AN ORGANIZED HEALTH CARE EDUCATION/TRAINING PROGRAM
Payer: MEDICARE

## 2025-08-26 DIAGNOSIS — R55 VASOVAGAL SYNCOPE: ICD-10-CM

## 2025-08-26 LAB — LVEF ECHO: NORMAL

## 2025-08-26 PROCEDURE — 93306 TTE W/DOPPLER COMPLETE: CPT | Mod: 26 | Performed by: STUDENT IN AN ORGANIZED HEALTH CARE EDUCATION/TRAINING PROGRAM

## 2025-08-26 PROCEDURE — 93306 TTE W/DOPPLER COMPLETE: CPT

## 2025-09-02 ENCOUNTER — MYC REFILL (OUTPATIENT)
Dept: CARDIOLOGY | Facility: CLINIC | Age: 75
End: 2025-09-02
Payer: MEDICARE

## 2025-09-02 DIAGNOSIS — R93.1 ELEVATED CORONARY ARTERY CALCIUM SCORE: ICD-10-CM

## 2025-09-02 DIAGNOSIS — M05.711 RHEUMATOID ARTHRITIS INVOLVING BOTH SHOULDERS WITH POSITIVE RHEUMATOID FACTOR (H): ICD-10-CM

## 2025-09-02 DIAGNOSIS — M05.712 RHEUMATOID ARTHRITIS INVOLVING BOTH SHOULDERS WITH POSITIVE RHEUMATOID FACTOR (H): ICD-10-CM

## 2025-09-02 DIAGNOSIS — R07.2 PRECORDIAL PAIN: ICD-10-CM

## 2025-09-02 DIAGNOSIS — R01.1 HEART MURMUR: ICD-10-CM

## 2025-09-03 RX ORDER — ROSUVASTATIN CALCIUM 5 MG/1
2.5 TABLET, COATED ORAL DAILY
Qty: 45 TABLET | Refills: 0 | Status: SHIPPED | OUTPATIENT
Start: 2025-09-03